# Patient Record
Sex: MALE | Race: WHITE | NOT HISPANIC OR LATINO
[De-identification: names, ages, dates, MRNs, and addresses within clinical notes are randomized per-mention and may not be internally consistent; named-entity substitution may affect disease eponyms.]

---

## 2020-12-10 ENCOUNTER — RESULT REVIEW (OUTPATIENT)
Age: 55
End: 2020-12-10

## 2023-01-30 PROBLEM — Z00.00 ENCOUNTER FOR PREVENTIVE HEALTH EXAMINATION: Status: ACTIVE | Noted: 2023-01-30

## 2023-02-01 ENCOUNTER — APPOINTMENT (OUTPATIENT)
Dept: ENDOCRINOLOGY | Facility: CLINIC | Age: 58
End: 2023-02-01
Payer: COMMERCIAL

## 2023-02-01 VITALS
OXYGEN SATURATION: 97 % | TEMPERATURE: 98.2 F | WEIGHT: 221 LBS | BODY MASS INDEX: 30.94 KG/M2 | DIASTOLIC BLOOD PRESSURE: 80 MMHG | HEART RATE: 92 BPM | SYSTOLIC BLOOD PRESSURE: 150 MMHG | HEIGHT: 71 IN

## 2023-02-01 DIAGNOSIS — Z78.9 OTHER SPECIFIED HEALTH STATUS: ICD-10-CM

## 2023-02-01 LAB
GLUCOSE BLDC GLUCOMTR-MCNC: 263
HBA1C MFR BLD HPLC: 10.8

## 2023-02-01 PROCEDURE — 99204 OFFICE O/P NEW MOD 45 MIN: CPT | Mod: 25

## 2023-02-01 PROCEDURE — 95250 CONT GLUC MNTR PHYS/QHP EQP: CPT

## 2023-02-01 PROCEDURE — 83036 HEMOGLOBIN GLYCOSYLATED A1C: CPT | Mod: QW

## 2023-02-01 RX ORDER — BLOOD-GLUCOSE,RECEIVER,CONT
EACH MISCELLANEOUS
Qty: 1 | Refills: 0 | Status: ACTIVE | COMMUNITY
Start: 2023-02-01 | End: 1900-01-01

## 2023-02-02 LAB
25(OH)D3 SERPL-MCNC: 37.7 NG/ML
ALBUMIN SERPL ELPH-MCNC: 4.7 G/DL
ALP BLD-CCNC: 83 U/L
ALT SERPL-CCNC: 23 U/L
ANION GAP SERPL CALC-SCNC: 16 MMOL/L
AST SERPL-CCNC: 17 U/L
BILIRUB SERPL-MCNC: 1.9 MG/DL
BUN SERPL-MCNC: 11 MG/DL
CALCIUM SERPL-MCNC: 10.3 MG/DL
CHLORIDE SERPL-SCNC: 99 MMOL/L
CHOLEST SERPL-MCNC: 171 MG/DL
CO2 SERPL-SCNC: 22 MMOL/L
CREAT SERPL-MCNC: 0.83 MG/DL
CREAT SPEC-SCNC: 70 MG/DL
EGFR: 101 ML/MIN/1.73M2
ESTIMATED AVERAGE GLUCOSE: 263 MG/DL
FRUCTOSAMINE SERPL-MCNC: 497 UMOL/L
GLUCOSE SERPL-MCNC: 247 MG/DL
GLYCOMARK.: 0.8 UG/ML
HBA1C MFR BLD HPLC: 10.8 %
HDLC SERPL-MCNC: 58 MG/DL
LDLC SERPL CALC-MCNC: 68 MG/DL
LDLC SERPL DIRECT ASSAY-MCNC: 89 MG/DL
MAGNESIUM SERPL-MCNC: 1.9 MG/DL
MICROALBUMIN 24H UR DL<=1MG/L-MCNC: 1.9 MG/DL
MICROALBUMIN/CREAT 24H UR-RTO: 27 MG/G
NONHDLC SERPL-MCNC: 113 MG/DL
POTASSIUM SERPL-SCNC: 4.4 MMOL/L
PROT SERPL-MCNC: 7.7 G/DL
SODIUM SERPL-SCNC: 138 MMOL/L
T3FREE SERPL-MCNC: 2.56 PG/ML
T4 FREE SERPL-MCNC: 1.4 NG/DL
TRIGL SERPL-MCNC: 227 MG/DL
TSH SERPL-ACNC: 1.79 UIU/ML

## 2023-02-05 PROBLEM — Z78.9 NON-SMOKER: Status: ACTIVE | Noted: 2023-02-05

## 2023-02-05 NOTE — HISTORY OF PRESENT ILLNESS
[FreeTextEntry1] : Mr. DECLAN PEÑA is a 57 year old male who presents for initial endocrine evaluation with regard to a hx of type 2 dm. The diabetes has been present since 2006. Prior was following with endo Dr. Saeed and Dr. Thomason. He denies any history of retinopathy or nephropathy. With regard to neuropathy, he has mild numbness under feet b/l but too has herniated disc in lower back , follows with chiropractor.\par \par For the diabetes, he is currently utilizing an insulin pump. He has been on pump for last 2 years. He too is taking  metformin 1000 mg BID. He does use  NovoLog (or Humalog) in the pump He has been under the impression that the basal insulin is not important for his glyc control and he feels he needs  to fine tune his bolus insulin in order to improve his glycemic control..  He though                    does forget to bolus while at work. \par \par is using omnipod dash \par \par Basal rates:\par 12 MN - 12 MN 0.05\par \par IC Ratio: 1:15\par CF: 30 \par \par 1.2 units/day for last week but has been 10 units/day. \par He does note that he does try and go over the bolus for correction.\par \par he did try Victoza in past but could not tolerate it due to N/V. \par \par He did try farxiga but could not tolerate it due to UTI \par \par Sometimes he will turn off basal because when he raises the basal somewhat, he does not feel optimally. As discussed as he increases his basal rate and improves his glycemic control-he may be experiencing relative hypoglycemia.\par \par \par \par He denies polyuria, polydipsia, or any visual changes. He  too denies any skin lesions, skin breakdown or non-healing areas of skin. He  too denies any podiatric concerns. Follows with podiatry Dr. Sandra  Ophthalmologic evaluation is up to date, follows with  . \par \par Home glucose monitoring via Jennifer has shown values to be running 200-250, has spikes up to 350- 400s .  He  does deny any hypoglycemia or hypoglycemic signs or symptoms.\par \par POCT A1C returned today  10.8 ; previously \par POCT glucose returned today at 263 mg/dl\par \par Family Hx: Father has diabetes, HLD, CAD, stroke \par \par On weekends, he is [place] on [place] . 30,000 steps on the weekends \par \par Additional medical history includes that of  HLD, substance use disorder \par Medications: atorvastatin , ASA 81 \par \par Social Hx:  Denies smoking , recovered alcohol for 28 years did use cocaine once 28 years, no active drug use. \par COVID 3x, did take paxlovid in Oct 2022

## 2023-02-09 ENCOUNTER — APPOINTMENT (OUTPATIENT)
Dept: ENDOCRINOLOGY | Facility: CLINIC | Age: 58
End: 2023-02-09
Payer: COMMERCIAL

## 2023-02-09 PROCEDURE — G0108 DIAB MANAGE TRN  PER INDIV: CPT

## 2023-03-07 ENCOUNTER — APPOINTMENT (OUTPATIENT)
Dept: ENDOCRINOLOGY | Facility: CLINIC | Age: 58
End: 2023-03-07
Payer: COMMERCIAL

## 2023-03-07 PROCEDURE — G0108 DIAB MANAGE TRN  PER INDIV: CPT

## 2023-05-12 ENCOUNTER — APPOINTMENT (OUTPATIENT)
Dept: ENDOCRINOLOGY | Facility: CLINIC | Age: 58
End: 2023-05-12
Payer: COMMERCIAL

## 2023-05-12 VITALS
HEART RATE: 85 BPM | SYSTOLIC BLOOD PRESSURE: 130 MMHG | DIASTOLIC BLOOD PRESSURE: 80 MMHG | BODY MASS INDEX: 30.52 KG/M2 | WEIGHT: 218 LBS | TEMPERATURE: 98 F | OXYGEN SATURATION: 97 % | HEIGHT: 71 IN

## 2023-05-12 LAB
GLUCOSE BLDC GLUCOMTR-MCNC: 232
HBA1C MFR BLD HPLC: 10.6

## 2023-05-12 PROCEDURE — 95251 CONT GLUC MNTR ANALYSIS I&R: CPT

## 2023-05-12 PROCEDURE — 83036 HEMOGLOBIN GLYCOSYLATED A1C: CPT | Mod: QW

## 2023-05-12 PROCEDURE — 36415 COLL VENOUS BLD VENIPUNCTURE: CPT

## 2023-05-12 PROCEDURE — 99214 OFFICE O/P EST MOD 30 MIN: CPT | Mod: 25

## 2023-05-12 RX ORDER — INSULIN PMP CART,AUT,G6/7,CNTR
EACH SUBCUTANEOUS
Qty: 45 | Refills: 3 | Status: ACTIVE | COMMUNITY
Start: 2023-05-12 | End: 1900-01-01

## 2023-05-12 RX ORDER — INSULIN PMP CART,AUT,G6/7,CNTR
EACH SUBCUTANEOUS
Qty: 1 | Refills: 0 | Status: ACTIVE | COMMUNITY
Start: 2023-05-12 | End: 1900-01-01

## 2023-05-21 NOTE — HISTORY OF PRESENT ILLNESS
[FreeTextEntry1] : Mr. DECLAN PEÑA is a 57 year old male who returns with regard to a hx of type 2 dm. The diabetes has been present since 2006. Prior was following with endo Dr. Saeed and Dr. Thomason. He denies any history of retinopathy or nephropathy. With regard to neuropathy, he has mild numbness under feet b/l but too has herniated disc in lower back, follows with chiropractor.\par \par For the diabetes, he is currently utilizing an insulin pump. He has been on pump for last 2 years. He too is taking  metformin 1000 mg BID. He does use  NovoLog (or Humalog) in the pump He has been under the impression that the basal insulin is not important for his glyc control and he feels he needs  to fine tune his bolus insulin in order to improve his glycemic control..  He though                    does forget to bolus while at work. \par \par is using omnipod dash \par \par Has met with CDE\par \par Basal rates:\par 12 MN - 12 MN: 0.25 \par After raising his basal rate, 2-3 days later he experiences shaky symptoms but notes that his BG's are around the same ranges as before. \par \par IC Ratio: 1:9\par ISF: 40\par \par AIT 3 hours.\par \par he did try Victoza in past but could not tolerate it due to N/V. \par \par He did try farxiga but could not tolerate it due to UTI\par \par He denies polyuria, polydipsia, or any visual changes. He  too denies any skin lesions, skin breakdown or non-healing areas of skin. He  too denies any podiatric concerns. Follows with podiatry Dr. Sandra  Ophthalmologic evaluation is up to date, follows with Dr. Grace . \par \par Home glucose monitoring via Jennifer has shown values to be running mainly running in the mid 200s all day, with some values in the 300s. \par \par He does deny any hypoglycemia or hypoglycemic signs or symptoms.\par \par POCT A1C returned today 10.6% \par POCT glucose returned today at 232\par \par Additional medical history includes that of  HLD, substance use disorder \par Medications: atorvastatin , ASA 81 \par \par Social Hx: Denies smoking , recovered alcohol for 28 years did use cocaine once 28 years, no active drug use.

## 2023-09-14 ENCOUNTER — APPOINTMENT (OUTPATIENT)
Dept: ENDOCRINOLOGY | Facility: CLINIC | Age: 58
End: 2023-09-14
Payer: COMMERCIAL

## 2023-09-14 VITALS
DIASTOLIC BLOOD PRESSURE: 80 MMHG | SYSTOLIC BLOOD PRESSURE: 140 MMHG | HEART RATE: 90 BPM | OXYGEN SATURATION: 97 % | HEIGHT: 71 IN | WEIGHT: 225.19 LBS | TEMPERATURE: 98 F | BODY MASS INDEX: 31.52 KG/M2

## 2023-09-14 DIAGNOSIS — E66.9 OBESITY, UNSPECIFIED: ICD-10-CM

## 2023-09-14 DIAGNOSIS — E16.2 HYPOGLYCEMIA, UNSPECIFIED: ICD-10-CM

## 2023-09-14 DIAGNOSIS — R03.0 ELEVATED BLOOD-PRESSURE READING, W/OUT DIAGNOSIS OF HYPERTENSION: ICD-10-CM

## 2023-09-14 LAB
GLUCOSE BLDC GLUCOMTR-MCNC: 196
HBA1C MFR BLD HPLC: 8.5

## 2023-09-14 PROCEDURE — 36415 COLL VENOUS BLD VENIPUNCTURE: CPT

## 2023-09-14 PROCEDURE — 99214 OFFICE O/P EST MOD 30 MIN: CPT | Mod: 25

## 2023-09-14 PROCEDURE — 82962 GLUCOSE BLOOD TEST: CPT

## 2023-09-14 PROCEDURE — 83036 HEMOGLOBIN GLYCOSYLATED A1C: CPT | Mod: QW

## 2023-09-15 LAB
ALBUMIN SERPL ELPH-MCNC: 4.7 G/DL
ALP BLD-CCNC: 72 U/L
ALT SERPL-CCNC: 19 U/L
ANION GAP SERPL CALC-SCNC: 15 MMOL/L
AST SERPL-CCNC: 16 U/L
BASOPHILS # BLD AUTO: 0.14 K/UL
BASOPHILS NFR BLD AUTO: 1.5 %
BILIRUB SERPL-MCNC: 1.8 MG/DL
BUN SERPL-MCNC: 11 MG/DL
CALCIUM SERPL-MCNC: 10 MG/DL
CHLORIDE SERPL-SCNC: 100 MMOL/L
CHOLEST SERPL-MCNC: 157 MG/DL
CO2 SERPL-SCNC: 23 MMOL/L
CREAT SERPL-MCNC: 0.91 MG/DL
CREAT SPEC-SCNC: 39 MG/DL
EGFR: 97 ML/MIN/1.73M2
EOSINOPHIL # BLD AUTO: 0.53 K/UL
EOSINOPHIL NFR BLD AUTO: 5.5 %
ESTIMATED AVERAGE GLUCOSE: 200 MG/DL
FRUCTOSAMINE SERPL-MCNC: 378 UMOL/L
GLUCOSE SERPL-MCNC: 180 MG/DL
GLYCOMARK.: 2.2 UG/ML
HBA1C MFR BLD HPLC: 8.6 %
HCT VFR BLD CALC: 46 %
HDLC SERPL-MCNC: 61 MG/DL
HGB BLD-MCNC: 15.2 G/DL
IMM GRANULOCYTES NFR BLD AUTO: 0.6 %
LDLC SERPL CALC-MCNC: 77 MG/DL
LDLC SERPL DIRECT ASSAY-MCNC: 83 MG/DL
LYMPHOCYTES # BLD AUTO: 1.98 K/UL
LYMPHOCYTES NFR BLD AUTO: 20.7 %
MAGNESIUM SERPL-MCNC: 1.9 MG/DL
MAN DIFF?: NORMAL
MCHC RBC-ENTMCNC: 29.6 PG
MCHC RBC-ENTMCNC: 33 GM/DL
MCV RBC AUTO: 89.5 FL
MICROALBUMIN 24H UR DL<=1MG/L-MCNC: <1.2 MG/DL
MICROALBUMIN/CREAT 24H UR-RTO: NORMAL MG/G
MONOCYTES # BLD AUTO: 0.73 K/UL
MONOCYTES NFR BLD AUTO: 7.6 %
NEUTROPHILS # BLD AUTO: 6.12 K/UL
NEUTROPHILS NFR BLD AUTO: 64.1 %
NONHDLC SERPL-MCNC: 95 MG/DL
PLATELET # BLD AUTO: 256 K/UL
POTASSIUM SERPL-SCNC: 4.6 MMOL/L
PROLACTIN SERPL-MCNC: 12.6 NG/ML
PROT SERPL-MCNC: 8 G/DL
PSA SERPL-MCNC: 0.8 NG/ML
RBC # BLD: 5.14 M/UL
RBC # FLD: 13.3 %
SHBG SERPL-SCNC: 29.7 NMOL/L
SODIUM SERPL-SCNC: 138 MMOL/L
T3FREE SERPL-MCNC: 2.67 PG/ML
T4 FREE SERPL-MCNC: 1.2 NG/DL
TRIGL SERPL-MCNC: 99 MG/DL
TSH SERPL-ACNC: 1.19 UIU/ML
WBC # FLD AUTO: 9.56 K/UL

## 2023-09-18 LAB
TESTOST FREE SERPL-MCNC: 12.8 PG/ML
TESTOST SERPL-MCNC: 376 NG/DL

## 2023-11-21 ENCOUNTER — TRANSCRIPTION ENCOUNTER (OUTPATIENT)
Age: 58
End: 2023-11-21

## 2023-12-20 ENCOUNTER — LABORATORY RESULT (OUTPATIENT)
Age: 58
End: 2023-12-20

## 2023-12-20 ENCOUNTER — APPOINTMENT (OUTPATIENT)
Dept: ENDOCRINOLOGY | Facility: CLINIC | Age: 58
End: 2023-12-20
Payer: COMMERCIAL

## 2023-12-20 VITALS
WEIGHT: 236.13 LBS | DIASTOLIC BLOOD PRESSURE: 82 MMHG | SYSTOLIC BLOOD PRESSURE: 140 MMHG | OXYGEN SATURATION: 98 % | HEART RATE: 88 BPM | HEIGHT: 71 IN | BODY MASS INDEX: 33.06 KG/M2

## 2023-12-20 LAB
GLUCOSE BLDC GLUCOMTR-MCNC: 180
HBA1C MFR BLD HPLC: 9.2

## 2023-12-20 PROCEDURE — 99214 OFFICE O/P EST MOD 30 MIN: CPT | Mod: 25

## 2023-12-20 PROCEDURE — 95251 CONT GLUC MNTR ANALYSIS I&R: CPT

## 2023-12-20 PROCEDURE — 83036 HEMOGLOBIN GLYCOSYLATED A1C: CPT | Mod: QW

## 2023-12-20 PROCEDURE — 82962 GLUCOSE BLOOD TEST: CPT

## 2023-12-20 NOTE — HISTORY OF PRESENT ILLNESS
[FreeTextEntry1] : Mr. DECLAN PEÑA is a 58-year-old male who returns with regard to a hx of type 2 dm. The diabetes has been present since 2006. He denies any history of retinopathy or nephropathy. With regard to neuropathy, he has mild numbness under feet b/l but too has herniated disc in lower back, follows with chiropractor.  For the diabetes, he is currently utilizing an insulin pump. He has been on pump for last approximately three years. He too is taking  metformin 1000 mg BID. He does use  NovoLog (or Humalog) in the pump He has been under the impression that the basal insulin is not important for his glycemic control and he feels he needs  to fine tune his bolus insulin in order to improve his glycemic control..  He though does  at times forget to bolus while at work.   is using omnipod 5 Has met with CDE  Basal rates: 12 MN - 12 MN 0.2  IC Ratio: 1:8.5 ISF: 35  Correction: 155 AIT 3 hours.  he did try Victoza in past but could not tolerate it due to N/V.  He did try farxiga but could not tolerate it due to UTI  He denies polyuria, polydipsia, or any visual changes. He  too denies any skin lesions, skin breakdown or non-healing areas of skin. He  too denies any podiatric concerns. Follows with podiatry Dr. Sandra  Ophthalmologic evaluation is up to date, follows with ; no diabetic retinopathy noted, but was told of slight cataract.   Home glucose monitoring via Aleta has shown values to be running sporadically ranging in the mid/high 100s to the high 300s. The patient states that a few weeks ago, his BG ranged between 130-180. He does deny any hypoglycemia or hypoglycemic signs or symptoms.  POCT A1C returned today at 9.2%, previously 8.6% on 09/14/23.  POCT glucose returned today at 180 mg/dL.   Patient notes that he has gained weight since he started taking more insulin. Denies change in diet.  His current weight is 236 pounds, previously 225 pounds in September 2023.   Notes that of late, he has been having rapid heart rate with leg cramps whenever he takes the stairs. There is no shortness of breath.   Additional medical history includes that of HLD, obesity, and substance use disorder  Medications: atorvastatin, ASA 81, vitamin D3 3000 IU daily.

## 2023-12-21 ENCOUNTER — APPOINTMENT (OUTPATIENT)
Dept: ENDOCRINOLOGY | Facility: CLINIC | Age: 58
End: 2023-12-21

## 2023-12-21 LAB
25(OH)D3 SERPL-MCNC: 40.7 NG/ML
ALBUMIN SERPL ELPH-MCNC: 4.4 G/DL
ALP BLD-CCNC: 73 U/L
ALT SERPL-CCNC: 29 U/L
ANION GAP SERPL CALC-SCNC: 14 MMOL/L
AST SERPL-CCNC: 18 U/L
BASOPHILS # BLD AUTO: 0.14 K/UL
BASOPHILS NFR BLD AUTO: 1.6 %
BILIRUB DIRECT SERPL-MCNC: 0.4 MG/DL
BILIRUB INDIRECT SERPL-MCNC: 1.2 MG/DL
BILIRUB SERPL-MCNC: 1.6 MG/DL
BUN SERPL-MCNC: 9 MG/DL
CALCIUM SERPL-MCNC: 9.6 MG/DL
CHLORIDE SERPL-SCNC: 103 MMOL/L
CHOLEST SERPL-MCNC: 159 MG/DL
CO2 SERPL-SCNC: 23 MMOL/L
CREAT SERPL-MCNC: 1 MG/DL
CREAT SPEC-SCNC: 32 MG/DL
EGFR: 86 ML/MIN/1.73M2
EOSINOPHIL # BLD AUTO: 0.46 K/UL
EOSINOPHIL NFR BLD AUTO: 5.3 %
ESTIMATED AVERAGE GLUCOSE: 197 MG/DL
FRUCTOSAMINE SERPL-MCNC: 352 UMOL/L
GLUCOSE SERPL-MCNC: 191 MG/DL
GLYCOMARK.: 2.1 UG/ML
HBA1C MFR BLD HPLC: 8.5 %
HCT VFR BLD CALC: 43.3 %
HDLC SERPL-MCNC: 62 MG/DL
HGB BLD-MCNC: 14.6 G/DL
IMM GRANULOCYTES NFR BLD AUTO: 0.7 %
LDLC SERPL DIRECT ASSAY-MCNC: 82 MG/DL
LYMPHOCYTES # BLD AUTO: 1.62 K/UL
LYMPHOCYTES NFR BLD AUTO: 18.6 %
MAGNESIUM SERPL-MCNC: 1.8 MG/DL
MAN DIFF?: NORMAL
MCHC RBC-ENTMCNC: 29 PG
MCHC RBC-ENTMCNC: 33.7 GM/DL
MCV RBC AUTO: 86.1 FL
MICROALBUMIN 24H UR DL<=1MG/L-MCNC: <1.2 MG/DL
MICROALBUMIN/CREAT 24H UR-RTO: NORMAL MG/G
MONOCYTES # BLD AUTO: 0.69 K/UL
MONOCYTES NFR BLD AUTO: 7.9 %
NEUTROPHILS # BLD AUTO: 5.73 K/UL
NEUTROPHILS NFR BLD AUTO: 65.9 %
PLATELET # BLD AUTO: 246 K/UL
POTASSIUM SERPL-SCNC: 5.2 MMOL/L
PROT SERPL-MCNC: 7.1 G/DL
RBC # BLD: 5.03 M/UL
RBC # FLD: 13.1 %
SODIUM SERPL-SCNC: 139 MMOL/L
T3FREE SERPL-MCNC: 2.78 PG/ML
T4 FREE SERPL-MCNC: 1.2 NG/DL
TRIGL SERPL-MCNC: 130 MG/DL
TSH SERPL-ACNC: 1.62 UIU/ML
WBC # FLD AUTO: 8.7 K/UL

## 2024-03-16 ENCOUNTER — RX RENEWAL (OUTPATIENT)
Age: 59
End: 2024-03-16

## 2024-03-16 RX ORDER — ATORVASTATIN CALCIUM 10 MG/1
10 TABLET, FILM COATED ORAL DAILY
Qty: 90 | Refills: 1 | Status: ACTIVE | COMMUNITY
Start: 2023-03-22 | End: 1900-01-01

## 2024-03-31 ENCOUNTER — RX RENEWAL (OUTPATIENT)
Age: 59
End: 2024-03-31

## 2024-03-31 RX ORDER — METFORMIN HYDROCHLORIDE 1000 MG/1
1000 TABLET, COATED ORAL TWICE DAILY
Qty: 180 | Refills: 2 | Status: ACTIVE | COMMUNITY
Start: 2023-03-22 | End: 1900-01-01

## 2024-05-20 ENCOUNTER — APPOINTMENT (OUTPATIENT)
Dept: ENDOCRINOLOGY | Facility: CLINIC | Age: 59
End: 2024-05-20
Payer: COMMERCIAL

## 2024-05-20 VITALS
TEMPERATURE: 98.2 F | HEIGHT: 71 IN | WEIGHT: 236 LBS | DIASTOLIC BLOOD PRESSURE: 66 MMHG | SYSTOLIC BLOOD PRESSURE: 130 MMHG | OXYGEN SATURATION: 96 % | HEART RATE: 95 BPM | BODY MASS INDEX: 33.04 KG/M2

## 2024-05-20 DIAGNOSIS — E78.5 HYPERLIPIDEMIA, UNSPECIFIED: ICD-10-CM

## 2024-05-20 DIAGNOSIS — E11.9 TYPE 2 DIABETES MELLITUS W/OUT COMPLICATIONS: ICD-10-CM

## 2024-05-20 PROBLEM — E16.2 HYPOGLYCEMIA: Status: ACTIVE | Noted: 2023-02-05

## 2024-05-20 PROBLEM — E66.9 OBESITY: Status: ACTIVE | Noted: 2023-05-21

## 2024-05-20 LAB
GLUCOSE BLDC GLUCOMTR-MCNC: 269
HBA1C MFR BLD HPLC: 8.9

## 2024-05-20 PROCEDURE — 99214 OFFICE O/P EST MOD 30 MIN: CPT

## 2024-05-20 PROCEDURE — 83036 HEMOGLOBIN GLYCOSYLATED A1C: CPT | Mod: QW

## 2024-05-20 PROCEDURE — 82962 GLUCOSE BLOOD TEST: CPT

## 2024-05-20 NOTE — HISTORY OF PRESENT ILLNESS
[FreeTextEntry1] : Mr. DECLAN PEÑA is a 58 year old male who returns with regard to a hx of type 2 dm. The diabetes has been present since 2006. He denies any history of retinopathy or nephropathy. With regard to neuropathy, he has mild numbness under feet b/l but too has herniated disc in lower back.  Fell off ladder and hurt shoulder broke rib on rt. X ray showed old humeral fx.  Too dx with BPH -is on Flomax was recommended to have green light laser procedure.  For the diabetes, he is currently utilizing an insulin pump. He has been on pump for over three years. He too is taking metformin 1000 mg BID. He does use NovoLog (or Humalog) in the pump He though does at times forget to bolus while at work.   is using omnipod 5 Has met with CDE  Basal rates: 12 MN - 12 MN 0.35  IC Ratio: 1:10 ISF: 50  AIT 3 hours.  he did try Victoza in past but could not tolerate it due to N/V.  He did try farxiga but could not tolerate it due to UTI  He denies polyuria, polydipsia, or any visual changes. He too denies any skin lesions, skin breakdown or non-healing areas of skin. He  too denies any podiatric concerns. Follows with podiatry Dr. Sandra  Ophthalmologic evaluation is up to date, follows with  .  Home glucose monitoring via Jennifer has shown values to be running 160-180 in the am   psot meals at 2 hrs in case 180's and at times in 140's.  Weight gain-has snacks at night.   He does deny any hypoglycemia or hypoglycemic signs or symptoms.  POCT A1C returned today at 8.9 % POCT glucose returned today at 269  Additional medical history includes that of HLD,  Medications: atorvastatin , ASA 81 Past hx of alcohol excess -in recovery x almost 30 years and past cocaine usage. Meds: Atorvastatin 10 mg

## 2024-05-20 NOTE — HISTORY OF PRESENT ILLNESS
[FreeTextEntry1] : Mr. DECLAN PEÑA is a 58 year old male who returns with regard to a hx of type 2 dm. The diabetes has been present since 2006. He denies any history of retinopathy or nephropathy. With regard to neuropathy, he has mild numbness under feet b/l but too has herniated disc in lower back.  For the diabetes, he is currently utilizing an insulin pump. He has been on pump for over three years. He too is taking metformin 1000 mg BID. He does use NovoLog (or Humalog) in the pump He though does at times forget to bolus while at work.   is using omnipod 5 Has met with CDE  Basal rates: 12 MN - 12 MN 0.35  IC Ratio: 1:10 ISF: 50  AIT 3 hours.  he did try Victoza in past but could not tolerate it due to N/V.  He did try farxiga but could not tolerate it due to UTI  He denies polyuria, polydipsia, or any visual changes. He too denies any skin lesions, skin breakdown or non-healing areas of skin. He  too denies any podiatric concerns. Follows with podiatry Dr. Sandra  Ophthalmologic evaluation is up to date, follows with  .  Home glucose monitoring via Jennifer has shown values to be running   He does deny any hypoglycemia or hypoglycemic signs or symptoms.  POCT A1C returned today POCT glucose returned today at   Additional medical history includes that of  HLD,  Medications: atorvastatin , ASA 81 Past hx of alcohol excess -in recovery x almost 30 years and past cocaine usage. Meds: Atorvastatin 10 mg

## 2024-05-20 NOTE — ADDENDUM
[FreeTextEntry1] : poct glucose and a1c were carried out today given diabetes diagnosis blood will be drawn in the office today

## 2024-05-21 DIAGNOSIS — R89.9 UNSPECIFIED ABNORMAL FINDING IN SPECIMENS FROM OTHER ORGANS, SYSTEMS AND TISSUES: ICD-10-CM

## 2024-05-21 DIAGNOSIS — E53.8 DEFICIENCY OF OTHER SPECIFIED B GROUP VITAMINS: ICD-10-CM

## 2024-05-21 LAB
25(OH)D3 SERPL-MCNC: 41.1 NG/ML
ALBUMIN SERPL ELPH-MCNC: 4.5 G/DL
ALP BLD-CCNC: 122 U/L
ALT SERPL-CCNC: 19 U/L
ANION GAP SERPL CALC-SCNC: 15 MMOL/L
AST SERPL-CCNC: 15 U/L
BASOPHILS # BLD AUTO: 0.16 K/UL
BASOPHILS NFR BLD AUTO: 1.8 %
BILIRUB SERPL-MCNC: 1.4 MG/DL
BUN SERPL-MCNC: 13 MG/DL
CALCIUM SERPL-MCNC: 10 MG/DL
CHLORIDE SERPL-SCNC: 98 MMOL/L
CHOLEST SERPL-MCNC: 161 MG/DL
CO2 SERPL-SCNC: 23 MMOL/L
CREAT SERPL-MCNC: 1.03 MG/DL
CREAT SPEC-SCNC: 25 MG/DL
EGFR: 83 ML/MIN/1.73M2
EOSINOPHIL # BLD AUTO: 0.68 K/UL
EOSINOPHIL NFR BLD AUTO: 7.5 %
ESTIMATED AVERAGE GLUCOSE: 206 MG/DL
FRUCTOSAMINE SERPL-MCNC: 373 UMOL/L
GLUCOSE SERPL-MCNC: 256 MG/DL
GLYCOMARK.: 1.7 UG/ML
HBA1C MFR BLD HPLC: 8.8 %
HCT VFR BLD CALC: 44.5 %
HDLC SERPL-MCNC: 60 MG/DL
HGB BLD-MCNC: 14.9 G/DL
IMM GRANULOCYTES NFR BLD AUTO: 0.6 %
LDLC SERPL DIRECT ASSAY-MCNC: 92 MG/DL
LYMPHOCYTES # BLD AUTO: 2.11 K/UL
LYMPHOCYTES NFR BLD AUTO: 23.4 %
MAGNESIUM SERPL-MCNC: 1.9 MG/DL
MAN DIFF?: NORMAL
MCHC RBC-ENTMCNC: 28.6 PG
MCHC RBC-ENTMCNC: 33.5 GM/DL
MCV RBC AUTO: 85.4 FL
MICROALBUMIN 24H UR DL<=1MG/L-MCNC: <1.2 MG/DL
MICROALBUMIN/CREAT 24H UR-RTO: NORMAL MG/G
MONOCYTES # BLD AUTO: 0.79 K/UL
MONOCYTES NFR BLD AUTO: 8.8 %
NEUTROPHILS # BLD AUTO: 5.22 K/UL
NEUTROPHILS NFR BLD AUTO: 57.9 %
PLATELET # BLD AUTO: 297 K/UL
POTASSIUM SERPL-SCNC: 5.2 MMOL/L
PROT SERPL-MCNC: 7.6 G/DL
RBC # BLD: 5.21 M/UL
RBC # FLD: 13 %
SODIUM SERPL-SCNC: 136 MMOL/L
T3FREE SERPL-MCNC: 2.91 PG/ML
T4 FREE SERPL-MCNC: 1.3 NG/DL
TRIGL SERPL-MCNC: 92 MG/DL
TSH SERPL-ACNC: 2.28 UIU/ML
VIT B12 SERPL-MCNC: 338 PG/ML
WBC # FLD AUTO: 9.01 K/UL

## 2024-05-21 RX ORDER — INSULIN ASPART 100 [IU]/ML
100 INJECTION, SOLUTION INTRAVENOUS; SUBCUTANEOUS
Qty: 9 | Refills: 3 | Status: ACTIVE | COMMUNITY
Start: 2023-03-22 | End: 1900-01-01

## 2024-05-21 RX ORDER — MAGNESIUM 200 MG
1000 TABLET ORAL
Qty: 45 | Refills: 1 | Status: ACTIVE | COMMUNITY
Start: 2024-05-21 | End: 1900-01-01

## 2024-05-21 RX ORDER — BLOOD-GLUCOSE TRANSMITTER
EACH MISCELLANEOUS
Qty: 1 | Refills: 3 | Status: ACTIVE | COMMUNITY
Start: 2023-02-01 | End: 1900-01-01

## 2024-05-21 RX ORDER — BLOOD-GLUCOSE SENSOR
EACH MISCELLANEOUS
Qty: 3 | Refills: 3 | Status: ACTIVE | COMMUNITY
Start: 2023-02-01 | End: 1900-01-01

## 2024-06-18 LAB
ALP BLD-CCNC: 87 IU/L
ALP BONE CFR SERPL: 43 %
ALP INTEST CFR SERPL: 0 %
ALP LIVER CFR SERPL: 57 %
GGT SERPL-CCNC: 15 U/L

## 2024-11-01 ENCOUNTER — APPOINTMENT (OUTPATIENT)
Dept: ENDOCRINOLOGY | Facility: CLINIC | Age: 59
End: 2024-11-01

## 2024-11-01 VITALS
HEART RATE: 85 BPM | HEIGHT: 71 IN | TEMPERATURE: 97.8 F | DIASTOLIC BLOOD PRESSURE: 80 MMHG | BODY MASS INDEX: 32.66 KG/M2 | WEIGHT: 233.31 LBS | SYSTOLIC BLOOD PRESSURE: 156 MMHG | OXYGEN SATURATION: 96 %

## 2024-11-01 VITALS — DIASTOLIC BLOOD PRESSURE: 76 MMHG | SYSTOLIC BLOOD PRESSURE: 132 MMHG

## 2024-11-01 DIAGNOSIS — E66.9 OBESITY, UNSPECIFIED: ICD-10-CM

## 2024-11-01 DIAGNOSIS — E78.5 HYPERLIPIDEMIA, UNSPECIFIED: ICD-10-CM

## 2024-11-01 DIAGNOSIS — E16.2 HYPOGLYCEMIA, UNSPECIFIED: ICD-10-CM

## 2024-11-01 DIAGNOSIS — E53.8 DEFICIENCY OF OTHER SPECIFIED B GROUP VITAMINS: ICD-10-CM

## 2024-11-01 DIAGNOSIS — E11.9 TYPE 2 DIABETES MELLITUS W/OUT COMPLICATIONS: ICD-10-CM

## 2024-11-01 LAB
GLUCOSE BLDC GLUCOMTR-MCNC: 222
HBA1C MFR BLD HPLC: 8.7

## 2024-11-01 PROCEDURE — 83036 HEMOGLOBIN GLYCOSYLATED A1C: CPT | Mod: QW

## 2024-11-01 PROCEDURE — 99214 OFFICE O/P EST MOD 30 MIN: CPT

## 2024-11-01 PROCEDURE — 82962 GLUCOSE BLOOD TEST: CPT

## 2024-11-01 PROCEDURE — 36415 COLL VENOUS BLD VENIPUNCTURE: CPT

## 2024-11-01 PROCEDURE — G2211 COMPLEX E/M VISIT ADD ON: CPT | Mod: NC

## 2024-11-02 ENCOUNTER — RX RENEWAL (OUTPATIENT)
Age: 59
End: 2024-11-02

## 2024-11-04 LAB
25(OH)D3 SERPL-MCNC: 41.6 NG/ML
ALBUMIN SERPL ELPH-MCNC: 4.4 G/DL
ALP BLD-CCNC: 90 U/L
ALT SERPL-CCNC: 28 U/L
ANION GAP SERPL CALC-SCNC: 16 MMOL/L
APO B SERPL-MCNC: 75 MG/DL
AST SERPL-CCNC: 19 U/L
BASOPHILS # BLD AUTO: 0.15 K/UL
BASOPHILS NFR BLD AUTO: 1.9 %
BILIRUB SERPL-MCNC: 1.6 MG/DL
BUN SERPL-MCNC: 10 MG/DL
CALCIUM SERPL-MCNC: 9.6 MG/DL
CHLORIDE SERPL-SCNC: 100 MMOL/L
CHOLEST SERPL-MCNC: 147 MG/DL
CO2 SERPL-SCNC: 22 MMOL/L
CREAT SERPL-MCNC: 0.92 MG/DL
CREAT SPEC-SCNC: 31 MG/DL
EGFR: 95 ML/MIN/1.73M2
EOSINOPHIL # BLD AUTO: 0.5 K/UL
EOSINOPHIL NFR BLD AUTO: 6.5 %
ESTIMATED AVERAGE GLUCOSE: 194 MG/DL
FRUCTOSAMINE SERPL-MCNC: 352 UMOL/L
GLUCOSE SERPL-MCNC: 241 MG/DL
GLYCOMARK.: 2.6 UG/ML
HBA1C MFR BLD HPLC: 8.4 %
HCT VFR BLD CALC: 44.7 %
HCYS SERPL-MCNC: 13 UMOL/L
HDLC SERPL-MCNC: 60 MG/DL
HGB BLD-MCNC: 14.5 G/DL
IMM GRANULOCYTES NFR BLD AUTO: 0.5 %
LDLC SERPL DIRECT ASSAY-MCNC: 77 MG/DL
LYMPHOCYTES # BLD AUTO: 1.69 K/UL
LYMPHOCYTES NFR BLD AUTO: 21.9 %
MAGNESIUM SERPL-MCNC: 1.8 MG/DL
MAN DIFF?: NORMAL
MCHC RBC-ENTMCNC: 29.4 PG
MCHC RBC-ENTMCNC: 32.4 G/DL
MCV RBC AUTO: 90.5 FL
MICROALBUMIN 24H UR DL<=1MG/L-MCNC: <1.2 MG/DL
MICROALBUMIN/CREAT 24H UR-RTO: NORMAL MG/G
MONOCYTES # BLD AUTO: 0.7 K/UL
MONOCYTES NFR BLD AUTO: 9.1 %
NEUTROPHILS # BLD AUTO: 4.65 K/UL
NEUTROPHILS NFR BLD AUTO: 60.1 %
PLATELET # BLD AUTO: 233 K/UL
POTASSIUM SERPL-SCNC: 5.1 MMOL/L
PROT SERPL-MCNC: 7 G/DL
RBC # BLD: 4.94 M/UL
RBC # FLD: 13 %
SODIUM SERPL-SCNC: 138 MMOL/L
T3FREE SERPL-MCNC: 3.99 PG/ML
T4 FREE SERPL-MCNC: 1.3 NG/DL
TRIGL SERPL-MCNC: 91 MG/DL
TSH SERPL-ACNC: 0.99 UIU/ML
WBC # FLD AUTO: 7.73 K/UL

## 2025-03-05 ENCOUNTER — RX RENEWAL (OUTPATIENT)
Age: 60
End: 2025-03-05

## 2025-04-19 ENCOUNTER — APPOINTMENT (OUTPATIENT)
Dept: ENDOCRINOLOGY | Facility: CLINIC | Age: 60
End: 2025-04-19

## 2025-04-19 VITALS
BODY MASS INDEX: 32.76 KG/M2 | HEIGHT: 71 IN | OXYGEN SATURATION: 97 % | DIASTOLIC BLOOD PRESSURE: 70 MMHG | SYSTOLIC BLOOD PRESSURE: 140 MMHG | TEMPERATURE: 98.3 F | WEIGHT: 234 LBS | HEART RATE: 93 BPM

## 2025-04-19 DIAGNOSIS — E11.9 TYPE 2 DIABETES MELLITUS W/OUT COMPLICATIONS: ICD-10-CM

## 2025-04-19 DIAGNOSIS — E16.2 HYPOGLYCEMIA, UNSPECIFIED: ICD-10-CM

## 2025-04-19 DIAGNOSIS — E78.5 HYPERLIPIDEMIA, UNSPECIFIED: ICD-10-CM

## 2025-04-19 DIAGNOSIS — E53.8 DEFICIENCY OF OTHER SPECIFIED B GROUP VITAMINS: ICD-10-CM

## 2025-04-19 DIAGNOSIS — E66.9 OBESITY, UNSPECIFIED: ICD-10-CM

## 2025-04-19 DIAGNOSIS — Z12.5 ENCOUNTER FOR SCREENING FOR MALIGNANT NEOPLASM OF PROSTATE: ICD-10-CM

## 2025-04-19 PROCEDURE — G2211 COMPLEX E/M VISIT ADD ON: CPT | Mod: NC

## 2025-04-19 PROCEDURE — 83036 HEMOGLOBIN GLYCOSYLATED A1C: CPT | Mod: QW

## 2025-04-19 PROCEDURE — 95251 CONT GLUC MNTR ANALYSIS I&R: CPT

## 2025-04-19 PROCEDURE — 82962 GLUCOSE BLOOD TEST: CPT

## 2025-04-19 PROCEDURE — 99214 OFFICE O/P EST MOD 30 MIN: CPT

## 2025-04-21 LAB
GLUCOSE BLDC GLUCOMTR-MCNC: 180
HBA1C MFR BLD HPLC: 8.4

## 2025-04-24 ENCOUNTER — INPATIENT (INPATIENT)
Facility: HOSPITAL | Age: 60
LOS: 3 days | Discharge: HOME | DRG: 446 | End: 2025-04-28
Attending: HOSPITALIST | Admitting: HOSPITALIST
Payer: COMMERCIAL

## 2025-04-24 VITALS
WEIGHT: 229.94 LBS | TEMPERATURE: 98 F | DIASTOLIC BLOOD PRESSURE: 78 MMHG | RESPIRATION RATE: 16 BRPM | HEART RATE: 98 BPM | SYSTOLIC BLOOD PRESSURE: 160 MMHG | OXYGEN SATURATION: 98 % | HEIGHT: 71 IN

## 2025-04-24 LAB
ALBUMIN SERPL ELPH-MCNC: 3.7 G/DL — SIGNIFICANT CHANGE UP (ref 3.3–5)
ALP SERPL-CCNC: 82 U/L — SIGNIFICANT CHANGE UP (ref 40–120)
ALT FLD-CCNC: 24 U/L — SIGNIFICANT CHANGE UP (ref 12–78)
ANION GAP SERPL CALC-SCNC: 14 MMOL/L — SIGNIFICANT CHANGE UP (ref 5–17)
APPEARANCE UR: CLEAR — SIGNIFICANT CHANGE UP
AST SERPL-CCNC: 12 U/L — LOW (ref 15–37)
BASOPHILS # BLD AUTO: 0.07 K/UL — SIGNIFICANT CHANGE UP (ref 0–0.2)
BASOPHILS NFR BLD AUTO: 0.4 % — SIGNIFICANT CHANGE UP (ref 0–2)
BILIRUB SERPL-MCNC: 3.4 MG/DL — HIGH (ref 0.2–1.2)
BILIRUB UR-MCNC: NEGATIVE — SIGNIFICANT CHANGE UP
BUN SERPL-MCNC: 12 MG/DL — SIGNIFICANT CHANGE UP (ref 7–23)
CALCIUM SERPL-MCNC: 9.9 MG/DL — SIGNIFICANT CHANGE UP (ref 8.5–10.1)
CHLORIDE SERPL-SCNC: 97 MMOL/L — SIGNIFICANT CHANGE UP (ref 96–108)
CO2 SERPL-SCNC: 24 MMOL/L — SIGNIFICANT CHANGE UP (ref 22–31)
COLOR SPEC: YELLOW — SIGNIFICANT CHANGE UP
CREAT SERPL-MCNC: 1.1 MG/DL — SIGNIFICANT CHANGE UP (ref 0.5–1.3)
DIFF PNL FLD: NEGATIVE — SIGNIFICANT CHANGE UP
EGFR: 76 ML/MIN/1.73M2 — SIGNIFICANT CHANGE UP
EGFR: 76 ML/MIN/1.73M2 — SIGNIFICANT CHANGE UP
EOSINOPHIL # BLD AUTO: 0.01 K/UL — SIGNIFICANT CHANGE UP (ref 0–0.5)
EOSINOPHIL NFR BLD AUTO: 0.1 % — SIGNIFICANT CHANGE UP (ref 0–6)
GLUCOSE SERPL-MCNC: 278 MG/DL — HIGH (ref 70–99)
GLUCOSE UR QL: >=1000 MG/DL
HCT VFR BLD CALC: 42.9 % — SIGNIFICANT CHANGE UP (ref 39–50)
HGB BLD-MCNC: 15.3 G/DL — SIGNIFICANT CHANGE UP (ref 13–17)
IMM GRANULOCYTES NFR BLD AUTO: 0.6 % — SIGNIFICANT CHANGE UP (ref 0–0.9)
KETONES UR-MCNC: 40 MG/DL
LEUKOCYTE ESTERASE UR-ACNC: NEGATIVE — SIGNIFICANT CHANGE UP
LIDOCAIN IGE QN: 17 U/L — SIGNIFICANT CHANGE UP (ref 13–75)
LYMPHOCYTES # BLD AUTO: 1.19 K/UL — SIGNIFICANT CHANGE UP (ref 1–3.3)
LYMPHOCYTES # BLD AUTO: 6.5 % — LOW (ref 13–44)
MCHC RBC-ENTMCNC: 29.1 PG — SIGNIFICANT CHANGE UP (ref 27–34)
MCHC RBC-ENTMCNC: 35.7 G/DL — SIGNIFICANT CHANGE UP (ref 32–36)
MCV RBC AUTO: 81.6 FL — SIGNIFICANT CHANGE UP (ref 80–100)
MONOCYTES # BLD AUTO: 1.36 K/UL — HIGH (ref 0–0.9)
MONOCYTES NFR BLD AUTO: 7.4 % — SIGNIFICANT CHANGE UP (ref 2–14)
NEUTROPHILS # BLD AUTO: 15.69 K/UL — HIGH (ref 1.8–7.4)
NEUTROPHILS NFR BLD AUTO: 85 % — HIGH (ref 43–77)
NITRITE UR-MCNC: NEGATIVE — SIGNIFICANT CHANGE UP
NRBC BLD AUTO-RTO: 0 /100 WBCS — SIGNIFICANT CHANGE UP (ref 0–0)
PH UR: 6 — SIGNIFICANT CHANGE UP (ref 5–8)
PLATELET # BLD AUTO: 228 K/UL — SIGNIFICANT CHANGE UP (ref 150–400)
POTASSIUM SERPL-MCNC: 4.2 MMOL/L — SIGNIFICANT CHANGE UP (ref 3.5–5.3)
POTASSIUM SERPL-SCNC: 4.2 MMOL/L — SIGNIFICANT CHANGE UP (ref 3.5–5.3)
PROT SERPL-MCNC: 8.2 G/DL — SIGNIFICANT CHANGE UP (ref 6–8.3)
PROT UR-MCNC: NEGATIVE MG/DL — SIGNIFICANT CHANGE UP
RBC # BLD: 5.26 M/UL — SIGNIFICANT CHANGE UP (ref 4.2–5.8)
RBC # FLD: 12.7 % — SIGNIFICANT CHANGE UP (ref 10.3–14.5)
SODIUM SERPL-SCNC: 135 MMOL/L — SIGNIFICANT CHANGE UP (ref 135–145)
SP GR SPEC: 1.04 — HIGH (ref 1–1.03)
UROBILINOGEN FLD QL: 0.2 MG/DL — SIGNIFICANT CHANGE UP (ref 0.2–1)
WBC # BLD: 18.43 K/UL — HIGH (ref 3.8–10.5)
WBC # FLD AUTO: 18.43 K/UL — HIGH (ref 3.8–10.5)

## 2025-04-24 PROCEDURE — 78226 HEPATOBILIARY SYSTEM IMAGING: CPT | Mod: 26

## 2025-04-24 PROCEDURE — 93010 ELECTROCARDIOGRAM REPORT: CPT

## 2025-04-24 PROCEDURE — 76705 ECHO EXAM OF ABDOMEN: CPT | Mod: 26

## 2025-04-24 PROCEDURE — 99285 EMERGENCY DEPT VISIT HI MDM: CPT

## 2025-04-24 PROCEDURE — 74177 CT ABD & PELVIS W/CONTRAST: CPT | Mod: 26

## 2025-04-24 RX ORDER — DEXTROSE 50 % IN WATER 50 %
12.5 SYRINGE (ML) INTRAVENOUS ONCE
Refills: 0 | Status: DISCONTINUED | OUTPATIENT
Start: 2025-04-24 | End: 2025-04-25

## 2025-04-24 RX ORDER — PIPERACILLIN-TAZO-DEXTROSE,ISO 2.25G/50ML
3.38 IV SOLUTION, PIGGYBACK PREMIX FROZEN(ML) INTRAVENOUS EVERY 8 HOURS
Refills: 0 | Status: DISCONTINUED | OUTPATIENT
Start: 2025-04-24 | End: 2025-04-25

## 2025-04-24 RX ORDER — ACETAMINOPHEN 500 MG/5ML
1000 LIQUID (ML) ORAL EVERY 8 HOURS
Refills: 0 | Status: DISCONTINUED | OUTPATIENT
Start: 2025-04-24 | End: 2025-04-25

## 2025-04-24 RX ORDER — INDOCYANINE GREEN AND WATER 25 MG
1.5 KIT INJECTION ONCE
Refills: 0 | Status: COMPLETED | OUTPATIENT
Start: 2025-04-25 | End: 2025-04-25

## 2025-04-24 RX ORDER — ATORVASTATIN CALCIUM 80 MG/1
10 TABLET, FILM COATED ORAL AT BEDTIME
Refills: 0 | Status: DISCONTINUED | OUTPATIENT
Start: 2025-04-24 | End: 2025-04-25

## 2025-04-24 RX ORDER — DEXTROSE 50 % IN WATER 50 %
25 SYRINGE (ML) INTRAVENOUS ONCE
Refills: 0 | Status: DISCONTINUED | OUTPATIENT
Start: 2025-04-24 | End: 2025-04-25

## 2025-04-24 RX ORDER — ATORVASTATIN CALCIUM 80 MG/1
1 TABLET, FILM COATED ORAL
Refills: 0 | DISCHARGE

## 2025-04-24 RX ORDER — SODIUM CHLORIDE 9 G/1000ML
1000 INJECTION, SOLUTION INTRAVENOUS
Refills: 0 | Status: DISCONTINUED | OUTPATIENT
Start: 2025-04-24 | End: 2025-04-25

## 2025-04-24 RX ORDER — PIPERACILLIN-TAZO-DEXTROSE,ISO 2.25G/50ML
3.38 IV SOLUTION, PIGGYBACK PREMIX FROZEN(ML) INTRAVENOUS ONCE
Refills: 0 | Status: COMPLETED | OUTPATIENT
Start: 2025-04-24 | End: 2025-04-24

## 2025-04-24 RX ORDER — METFORMIN HYDROCHLORIDE 850 MG/1
0 TABLET ORAL
Refills: 0 | DISCHARGE

## 2025-04-24 RX ORDER — DEXTROSE 50 % IN WATER 50 %
15 SYRINGE (ML) INTRAVENOUS ONCE
Refills: 0 | Status: DISCONTINUED | OUTPATIENT
Start: 2025-04-24 | End: 2025-04-25

## 2025-04-24 RX ORDER — MAGNESIUM, ALUMINUM HYDROXIDE 200-200 MG
30 TABLET,CHEWABLE ORAL EVERY 4 HOURS
Refills: 0 | Status: DISCONTINUED | OUTPATIENT
Start: 2025-04-24 | End: 2025-04-25

## 2025-04-24 RX ORDER — SODIUM CHLORIDE 9 G/1000ML
1000 INJECTION, SOLUTION INTRAVENOUS
Refills: 0 | Status: DISCONTINUED | OUTPATIENT
Start: 2025-04-24 | End: 2025-04-24

## 2025-04-24 RX ORDER — TAMSULOSIN HYDROCHLORIDE 0.4 MG/1
0.4 CAPSULE ORAL AT BEDTIME
Refills: 0 | Status: DISCONTINUED | OUTPATIENT
Start: 2025-04-24 | End: 2025-04-25

## 2025-04-24 RX ORDER — TAMSULOSIN HYDROCHLORIDE 0.4 MG/1
1 CAPSULE ORAL
Refills: 0 | DISCHARGE

## 2025-04-24 RX ORDER — GLUCAGON 3 MG/1
1 POWDER NASAL ONCE
Refills: 0 | Status: DISCONTINUED | OUTPATIENT
Start: 2025-04-24 | End: 2025-04-25

## 2025-04-24 RX ORDER — METFORMIN HYDROCHLORIDE 850 MG/1
1 TABLET ORAL
Refills: 0 | DISCHARGE

## 2025-04-24 RX ORDER — ONDANSETRON HCL/PF 4 MG/2 ML
4 VIAL (ML) INJECTION EVERY 8 HOURS
Refills: 0 | Status: DISCONTINUED | OUTPATIENT
Start: 2025-04-24 | End: 2025-04-25

## 2025-04-24 RX ORDER — ACETAMINOPHEN 500 MG/5ML
1000 LIQUID (ML) ORAL ONCE
Refills: 0 | Status: COMPLETED | OUTPATIENT
Start: 2025-04-24 | End: 2025-04-24

## 2025-04-24 RX ORDER — MELATONIN 5 MG
3 TABLET ORAL AT BEDTIME
Refills: 0 | Status: DISCONTINUED | OUTPATIENT
Start: 2025-04-24 | End: 2025-04-25

## 2025-04-24 RX ORDER — ASPIRIN 325 MG
1 TABLET ORAL
Refills: 0 | DISCHARGE

## 2025-04-24 RX ORDER — ACETAMINOPHEN 500 MG/5ML
650 LIQUID (ML) ORAL EVERY 6 HOURS
Refills: 0 | Status: DISCONTINUED | OUTPATIENT
Start: 2025-04-24 | End: 2025-04-25

## 2025-04-24 RX ADMIN — TAMSULOSIN HYDROCHLORIDE 0.4 MILLIGRAM(S): 0.4 CAPSULE ORAL at 22:34

## 2025-04-24 RX ADMIN — Medication 40 MILLIGRAM(S): at 10:28

## 2025-04-24 RX ADMIN — Medication 1000 MILLIGRAM(S): at 13:50

## 2025-04-24 RX ADMIN — Medication 200 GRAM(S): at 14:01

## 2025-04-24 RX ADMIN — Medication 400 MILLIGRAM(S): at 22:34

## 2025-04-24 RX ADMIN — SODIUM CHLORIDE 100 MILLILITER(S): 9 INJECTION, SOLUTION INTRAVENOUS at 18:28

## 2025-04-24 RX ADMIN — Medication 1000 MILLILITER(S): at 10:28

## 2025-04-24 RX ADMIN — Medication 400 MILLIGRAM(S): at 10:28

## 2025-04-24 RX ADMIN — Medication 25 GRAM(S): at 22:37

## 2025-04-24 RX ADMIN — ATORVASTATIN CALCIUM 10 MILLIGRAM(S): 80 TABLET, FILM COATED ORAL at 22:34

## 2025-04-24 RX ADMIN — Medication 1000 MILLILITER(S): at 13:50

## 2025-04-24 NOTE — H&P ADULT - HISTORY OF PRESENT ILLNESS
59m in recovery x 30 years with DM on insulin pump, BPH, HLD,  p/w abd pain. Pain started last night after eating chik-[employer]ent[employer]-a.  pain is RUQ, nonradiating, and was severe. pt reports nausea and anorexia.  Of note pt had EGD yesterday and was told there were ulcers. Pt has no cardiac history, has good exercise tolerance, and denies cp, palpitations, and sob.

## 2025-04-24 NOTE — ED PROVIDER NOTE - CLINICAL SUMMARY MEDICAL DECISION MAKING FREE TEXT BOX
Right-sided abdominal pain post endoscopy yesterday with case requiring thorough evaluation performed in an independent manner followed by routine labs CBC comprehensive metabolic panel urinalysis PT PTT and lipase as well as CT scan of the abdomen and pelvis with IV contrast.  Medications to be administered as well.

## 2025-04-24 NOTE — ED ADULT TRIAGE NOTE - SOURCE OF INFORMATION
"[facility] Outpatient Occupational Therapy  Initial Evaluation/Discharge Note     Date: 8/31/2020  Name: Ayesha Thompson  Clinic Number: [id number]    Therapy Diagnosis: Paraplegia  Physician: Yvonne Mendoza Gila Paul, OT           Patient

## 2025-04-24 NOTE — ED PROVIDER NOTE - PHYSICAL EXAMINATION
Examination reveals a well-developed well-nourished white male lying comfortably in bed in no acute distress with clear lungs normal heart sounds and abdomen that slightly protuberant with a palpable ventral hernia and a small umbilical hernia both nonincarcerated slight right upper and right mid/right lower quadrant abdominal tenderness to deep palpation.  Extremities are free from any clubbing cyanosis or edema neurologic evaluation shows patient awake and alert oriented x 4 without any focal neurologic deficits.

## 2025-04-24 NOTE — H&P ADULT - ASSESSMENT
59m in recovery x 30 years with DM on insulin pump, BPH, HLD,  p/w acute cholecystitis  positive HIDA  US and CT consistent with acute cholecystitis  surgery following  plan for lap melanie  medically acceptable for procedure  npo    pain  iv tylenol  pt wants to avoid narcotics    DM  diabetic diet  fingersticks  continue insulin pump  endocrine consulted    BPH  continue flomax    HLD  continue lipitor

## 2025-04-24 NOTE — ED ADULT NURSE NOTE - OBJECTIVE STATEMENT
Pt received in bed alert and oriented with the c/o abd pain and tenderness since yesterday. As per pt he had an upper endoscopy yesterday that showed an ulcer and then he had a chick-[employer]ent[employer]-A sandwich for dinner and experienced excruciating  pain. As per Md's orders IV shanice placed blod specimen obtained and sent to the lab pt stable and meds given and tolerated well. Nursing care ongoing and safety maintained

## 2025-04-24 NOTE — ED PROVIDER NOTE - OBJECTIVE STATEMENT
Patient is a 59-year-old  white male with history of insulin requiring diabetes and hyperlipidemia who presents to the emergency department here today complaining of right-sided abdominal pain.  Patient's had intermittent bouts of loose stools possibly food related culminating in an elective scheduled endoscopy 24 hours ago.  Patient was asymptomatic following the procedure at 8:00 yesterday morning.  Patient was discharged at 10:00 AM.  Patient ate lunch in an uneventful manner but shortly thereafter began to develop mild right-sided nonradiating abdominal pain without any nausea or vomiting.  Patient ultimately called her gastroenterologist who informed patient that it was most likely related to air/gas but to seek medical attention if pain persisted.  Upon awakening this morning patient was significant but intensity declined.  As result of continued right-sided abdominal pain patient presents here with wife for further evaluation care treatment and management.

## 2025-04-24 NOTE — ED PROVIDER NOTE - PROGRESS NOTE DETAILS
Patient seen by surgical service requested admission to the medical service with diagnosis of acute cholecystitis. Case discussed with Dr. Dank Ballard and patient will be admitted to his service with continued evaluation with Dr. Elvis Lawrence surgery service.

## 2025-04-24 NOTE — ED ADULT NURSE NOTE - IS THE PATIENT ABLE TO BE SCREENED?
[de-identified] : 
Yes

## 2025-04-24 NOTE — CONSULT NOTE ADULT - SUBJECTIVE AND OBJECTIVE BOX
SURGERY CONSULT NOTE:    CHIEF COMPLAINT:  Patient is a 59y old  Male who presents with a chief complaint of RUQ abdominal pain    HPI:  59M PMHx Gilbert syndrome, cholelithiasis, diabetes, hyperlipidemia who presents with one day of right upper quadrant pain. Yesterday morning patient underwent EGD for evaluation of 'loose stools' and was found to have ulcers. Patient initially felt well after procedure and went to [facility] for a chicken sandwich, after which he noted RUQ abdominal pain. Mild nausea overnight but no emesis. Denies fevers or chills. Last BM the day before procedure (4/22); patient states he took imodium yesterday 'out of habit'. Passed flatus yesterday but not as much today. Patient has remote history of RUQ pain after fatty meals and was previously found to have a 'mobile gallstone'. His symptoms resolved with diet modification and has not had biliary colic in a while.     Denies prior surgery. NKDA. Denies tobacco use. Sober from alcohol x30 years. Last PO intake sips of water this morning.    REVIEW OF SYSTEMS:  12 point ROS negative except as per HPI    MEDICATIONS:  Home Medications:    MEDICATIONS  (STANDING):    MEDICATIONS  (PRN):      ALLERGIES:  Allergies    No Known Allergies    Intolerances        SOCIAL HISTORY:  Social History:    Smoking: Yes [ ]  No [X]   ______pk yrs  ETOH  Yes [ ]  No [X]  Social [ ]  DRUGS:  Yes [ ]  No [ ]  if so what______________    FAMILY HISTORY:  FAMILY HISTORY:      VITAL SIGNS:  Vital Signs Last 24 Hrs  T(C): 36.8 (24 Apr 2025 09:00), Max: 36.8 (24 Apr 2025 09:00)  T(F): 98.2 (24 Apr 2025 09:00), Max: 98.2 (24 Apr 2025 09:00)  HR: 98 (24 Apr 2025 09:00) (98 - 98)  BP: 160/78 (24 Apr 2025 09:00) (160/78 - 160/78)  BP(mean): --  RR: 16 (24 Apr 2025 09:00) (16 - 16)  SpO2: 98% (24 Apr 2025 09:00) (98% - 98%)    Parameters below as of 24 Apr 2025 09:00  Patient On (Oxygen Delivery Method): room air        PHYSICAL EXAM:  Gen: well-appearing, NAD  Resp: breathing comfortably on RA  CV: warm and well perfused  Abdomen: Soft, mildly distended, not tympanic, RUQ tender without rebound or guarding, no epigastric tenderness, small umbilical hernia  Ext: moving all extremities    LABS:                        15.3   18.43 )-----------( 228      ( 24 Apr 2025 10:40 )             42.9     04-24    135  |  97  |  12  ----------------------------<  278[H]  4.2   |  24  |  1.10    Ca    9.9      24 Apr 2025 10:40    TPro  8.2  /  Alb  3.7  /  TBili  3.4[H]  /  DBili  x   /  AST  12[L]  /  ALT  24  /  AlkPhos  82  04-24      Urinalysis Basic - ( 24 Apr 2025 10:40 )    Color: x / Appearance: x / SG: x / pH: x  Gluc: 278 mg/dL / Ketone: x  / Bili: x / Urobili: x   Blood: x / Protein: x / Nitrite: x   Leuk Esterase: x / RBC: x / WBC x   Sq Epi: x / Non Sq Epi: x / Bacteria: x      LIVER FUNCTIONS - ( 24 Apr 2025 10:40 )  Alb: 3.7 g/dL / Pro: 8.2 g/dL / ALK PHOS: 82 U/L / ALT: 24 U/L / AST: 12 U/L / GGT: x               RADIOLOGY & ADDITIONAL STUDIES:  4/24/2025 CT A/P  FINDINGS:  LOWER CHEST: Dependent atelectatic changes. Tiny hiatal hernia    LIVER: Within normal limits.  BILE DUCTS: Normal caliber.  GALLBLADDER: Cholelithiasis. Pericholecystic stranding raising possibility of cholecystitis. Correlate with symptomatology and right upper quadrant ultrasound..  SPLEEN: Within normal limits.  PANCREAS: Within normal limits.  ADRENALS: Within normal limits.  KIDNEYS/URETERS: Left renal cyst. No hydronephrosis. Bilateral nonspecific perinephric stranding    BLADDER: Markedly distended without wall thickening  REPRODUCTIVE ORGANS: Normal prostate    BOWEL: No bowel obstruction or active inflammation. Fluid and gas-filled periampullary duodenal diverticulum. Appendix no pericecal inflammation to suggest appendicitis  PERITONEUM/RETROPERITONEUM: Within normal limits.  VESSELS: Nonaneurysmal.  LYMPH NODES: No lymphadenopathy.  ABDOMINAL WALL: Mild widening bilateral inguinal ring with fat. Small fat-containing umbilical hernia  BONES: Degenerative changes. Mild loss of height of T12 vertebral body probably chronic    IMPRESSION:  Cholelithiasis with pericholecystic stranding. Cholecystitis is a consideration. Correlate with the right upper quadrant ultrasound

## 2025-04-24 NOTE — CONSULT NOTE ADULT - ASSESSMENT
59M with history of Gilbert syndrome, cholelithiasis, T2DM, HLD who underwent EGD 4/23 for diagnostic evaluation of loose stool and several hours later developed mild RUQ pain and nausea. VSSAF. Labs notable for WBC 18.4 w/ left shift, Tbili 3.4, normal lipase. CT showed cholelithiasis and some pericholecystic fat stranding. Abdominal ultrasound pending.    - f/u abdominal ultrasound  - NPO/IVF  - Pain control as needed  - Surgery will follow until ultrasound results  Discussed with attending Dr. Lawrence  59M with history of Gilbert syndrome, cholelithiasis, T2DM, HLD who underwent EGD 4/23 for diagnostic evaluation of loose stool and several hours later developed mild RUQ pain and nausea. VSSAF. Labs notable for WBC 18.4 w/ left shift, Tbili 3.4, normal lipase. CT showed cholelithiasis and some pericholecystic fat stranding. Abdominal ultrasound with GB wall 4mm, negative sonographic Anderson's.     - f/u HIDA scan  - NPO/IVF  - Pain control as needed  Discussed with attending Dr. Lawrence  59M with history of Gilbert syndrome, cholelithiasis, T2DM, HLD who underwent EGD 4/23 for diagnostic evaluation of loose stool and several hours later developed mild RUQ pain and nausea. VSSAF. Labs notable for WBC 18.4 w/ left shift, Tbili 3.4, normal lipase. CT showed cholelithiasis and some pericholecystic fat stranding. Abdominal ultrasound with GB wall 4mm, negative sonographic Anderson's.     - HIDA scan  - Zosyn   - NPO/IVF  - Pain control as needed  Discussed with attending Dr. Lawrence

## 2025-04-25 ENCOUNTER — RESULT REVIEW (OUTPATIENT)
Age: 60
End: 2025-04-25

## 2025-04-25 ENCOUNTER — TRANSCRIPTION ENCOUNTER (OUTPATIENT)
Age: 60
End: 2025-04-25

## 2025-04-25 DIAGNOSIS — E10.65 TYPE 1 DIABETES MELLITUS WITH HYPERGLYCEMIA: ICD-10-CM

## 2025-04-25 DIAGNOSIS — K81.9 CHOLECYSTITIS, UNSPECIFIED: ICD-10-CM

## 2025-04-25 LAB
A1C WITH ESTIMATED AVERAGE GLUCOSE RESULT: 8.9 % — HIGH (ref 4–5.6)
ALBUMIN SERPL ELPH-MCNC: 3.1 G/DL — LOW (ref 3.3–5)
ALP SERPL-CCNC: 77 U/L — SIGNIFICANT CHANGE UP (ref 40–120)
ALT FLD-CCNC: 35 U/L — SIGNIFICANT CHANGE UP (ref 12–78)
ANION GAP SERPL CALC-SCNC: 6 MMOL/L — SIGNIFICANT CHANGE UP (ref 5–17)
APTT BLD: 28.8 SEC — SIGNIFICANT CHANGE UP (ref 26.1–36.8)
AST SERPL-CCNC: 20 U/L — SIGNIFICANT CHANGE UP (ref 15–37)
BASOPHILS # BLD AUTO: 0.09 K/UL — SIGNIFICANT CHANGE UP (ref 0–0.2)
BASOPHILS NFR BLD AUTO: 0.5 % — SIGNIFICANT CHANGE UP (ref 0–2)
BILIRUB SERPL-MCNC: 4.4 MG/DL — HIGH (ref 0.2–1.2)
BUN SERPL-MCNC: 11 MG/DL — SIGNIFICANT CHANGE UP (ref 7–23)
CALCIUM SERPL-MCNC: 8.9 MG/DL — SIGNIFICANT CHANGE UP (ref 8.5–10.1)
CHLORIDE SERPL-SCNC: 101 MMOL/L — SIGNIFICANT CHANGE UP (ref 96–108)
CO2 SERPL-SCNC: 24 MMOL/L — SIGNIFICANT CHANGE UP (ref 22–31)
CREAT SERPL-MCNC: 1.1 MG/DL — SIGNIFICANT CHANGE UP (ref 0.5–1.3)
EGFR: 76 ML/MIN/1.73M2 — SIGNIFICANT CHANGE UP
EGFR: 76 ML/MIN/1.73M2 — SIGNIFICANT CHANGE UP
EOSINOPHIL # BLD AUTO: 0.01 K/UL — SIGNIFICANT CHANGE UP (ref 0–0.5)
EOSINOPHIL NFR BLD AUTO: 0.1 % — SIGNIFICANT CHANGE UP (ref 0–6)
ESTIMATED AVERAGE GLUCOSE: 209 MG/DL — HIGH (ref 68–114)
GLUCOSE SERPL-MCNC: 256 MG/DL — HIGH (ref 70–99)
HCT VFR BLD CALC: 42.2 % — SIGNIFICANT CHANGE UP (ref 39–50)
HGB BLD-MCNC: 14.7 G/DL — SIGNIFICANT CHANGE UP (ref 13–17)
IMM GRANULOCYTES NFR BLD AUTO: 0.8 % — SIGNIFICANT CHANGE UP (ref 0–0.9)
INR BLD: 1.11 RATIO — SIGNIFICANT CHANGE UP (ref 0.85–1.16)
LYMPHOCYTES # BLD AUTO: 1.17 K/UL — SIGNIFICANT CHANGE UP (ref 1–3.3)
LYMPHOCYTES # BLD AUTO: 6.2 % — LOW (ref 13–44)
MCHC RBC-ENTMCNC: 28.9 PG — SIGNIFICANT CHANGE UP (ref 27–34)
MCHC RBC-ENTMCNC: 34.8 G/DL — SIGNIFICANT CHANGE UP (ref 32–36)
MCV RBC AUTO: 83.1 FL — SIGNIFICANT CHANGE UP (ref 80–100)
MONOCYTES # BLD AUTO: 1.43 K/UL — HIGH (ref 0–0.9)
MONOCYTES NFR BLD AUTO: 7.6 % — SIGNIFICANT CHANGE UP (ref 2–14)
NEUTROPHILS # BLD AUTO: 16.01 K/UL — HIGH (ref 1.8–7.4)
NEUTROPHILS NFR BLD AUTO: 84.8 % — HIGH (ref 43–77)
NRBC BLD AUTO-RTO: 0 /100 WBCS — SIGNIFICANT CHANGE UP (ref 0–0)
PLATELET # BLD AUTO: 196 K/UL — SIGNIFICANT CHANGE UP (ref 150–400)
POTASSIUM SERPL-MCNC: 3.9 MMOL/L — SIGNIFICANT CHANGE UP (ref 3.5–5.3)
POTASSIUM SERPL-SCNC: 3.9 MMOL/L — SIGNIFICANT CHANGE UP (ref 3.5–5.3)
PROT SERPL-MCNC: 7.3 G/DL — SIGNIFICANT CHANGE UP (ref 6–8.3)
PROTHROM AB SERPL-ACNC: 13 SEC — SIGNIFICANT CHANGE UP (ref 9.9–13.4)
RBC # BLD: 5.08 M/UL — SIGNIFICANT CHANGE UP (ref 4.2–5.8)
RBC # FLD: 12.8 % — SIGNIFICANT CHANGE UP (ref 10.3–14.5)
SODIUM SERPL-SCNC: 131 MMOL/L — LOW (ref 135–145)
WBC # BLD: 18.86 K/UL — HIGH (ref 3.8–10.5)
WBC # FLD AUTO: 18.86 K/UL — HIGH (ref 3.8–10.5)

## 2025-04-25 PROCEDURE — 47563 LAPARO CHOLECYSTECTOMY/GRAPH: CPT | Mod: 22

## 2025-04-25 PROCEDURE — 99223 1ST HOSP IP/OBS HIGH 75: CPT | Mod: 57

## 2025-04-25 PROCEDURE — 47563 LAPARO CHOLECYSTECTOMY/GRAPH: CPT | Mod: 80,22

## 2025-04-25 PROCEDURE — 88304 TISSUE EXAM BY PATHOLOGIST: CPT | Mod: 26

## 2025-04-25 DEVICE — CLIP APPLIER COVIDIEN ENDOCLIP 10MM LARGE: Type: IMPLANTABLE DEVICE | Status: FUNCTIONAL

## 2025-04-25 DEVICE — LIGATING CLIPS WECK HEMOLOK POLYMER EXTRA LARGE (GOLD) 6: Type: IMPLANTABLE DEVICE | Status: FUNCTIONAL

## 2025-04-25 DEVICE — CLIP APPLIER COVIDIEN ENDOCLIP II 10MM MED/LG: Type: IMPLANTABLE DEVICE | Status: FUNCTIONAL

## 2025-04-25 DEVICE — ARISTA 3GR: Type: IMPLANTABLE DEVICE | Status: FUNCTIONAL

## 2025-04-25 RX ORDER — ACETAMINOPHEN 500 MG/5ML
1000 LIQUID (ML) ORAL ONCE
Refills: 0 | Status: DISCONTINUED | OUTPATIENT
Start: 2025-04-25 | End: 2025-04-25

## 2025-04-25 RX ORDER — GLUCAGON 3 MG/1
1 POWDER NASAL ONCE
Refills: 0 | Status: DISCONTINUED | OUTPATIENT
Start: 2025-04-25 | End: 2025-04-28

## 2025-04-25 RX ORDER — INSULIN ASPART 100 [IU]/ML
1 INJECTION, SOLUTION INTRAVENOUS; SUBCUTANEOUS
Refills: 0 | Status: DISCONTINUED | OUTPATIENT
Start: 2025-04-25 | End: 2025-04-28

## 2025-04-25 RX ORDER — MELATONIN 5 MG
3 TABLET ORAL AT BEDTIME
Refills: 0 | Status: DISCONTINUED | OUTPATIENT
Start: 2025-04-25 | End: 2025-04-25

## 2025-04-25 RX ORDER — INSULIN LISPRO 100 U/ML
INJECTION, SOLUTION INTRAVENOUS; SUBCUTANEOUS AT BEDTIME
Refills: 0 | Status: DISCONTINUED | OUTPATIENT
Start: 2025-04-25 | End: 2025-04-28

## 2025-04-25 RX ORDER — TAMSULOSIN HYDROCHLORIDE 0.4 MG/1
0.4 CAPSULE ORAL AT BEDTIME
Refills: 0 | Status: DISCONTINUED | OUTPATIENT
Start: 2025-04-25 | End: 2025-04-28

## 2025-04-25 RX ORDER — KETOROLAC TROMETHAMINE 30 MG/ML
15 INJECTION, SOLUTION INTRAMUSCULAR; INTRAVENOUS EVERY 6 HOURS
Refills: 0 | Status: DISCONTINUED | OUTPATIENT
Start: 2025-04-25 | End: 2025-04-26

## 2025-04-25 RX ORDER — INSULIN ASPART 100 [IU]/ML
1 INJECTION, SOLUTION INTRAVENOUS; SUBCUTANEOUS
Refills: 0 | Status: DISCONTINUED | OUTPATIENT
Start: 2025-04-25 | End: 2025-04-25

## 2025-04-25 RX ORDER — ATORVASTATIN CALCIUM 80 MG/1
10 TABLET, FILM COATED ORAL AT BEDTIME
Refills: 0 | Status: DISCONTINUED | OUTPATIENT
Start: 2025-04-25 | End: 2025-04-28

## 2025-04-25 RX ORDER — SODIUM CHLORIDE 9 G/1000ML
1000 INJECTION, SOLUTION INTRAVENOUS
Refills: 0 | Status: DISCONTINUED | OUTPATIENT
Start: 2025-04-25 | End: 2025-04-25

## 2025-04-25 RX ORDER — PIPERACILLIN-TAZO-DEXTROSE,ISO 2.25G/50ML
3.38 IV SOLUTION, PIGGYBACK PREMIX FROZEN(ML) INTRAVENOUS EVERY 8 HOURS
Refills: 0 | Status: DISCONTINUED | OUTPATIENT
Start: 2025-04-25 | End: 2025-04-28

## 2025-04-25 RX ORDER — ONDANSETRON HCL/PF 4 MG/2 ML
4 VIAL (ML) INJECTION ONCE
Refills: 0 | Status: DISCONTINUED | OUTPATIENT
Start: 2025-04-25 | End: 2025-04-25

## 2025-04-25 RX ORDER — ONDANSETRON HCL/PF 4 MG/2 ML
4 VIAL (ML) INJECTION EVERY 8 HOURS
Refills: 0 | Status: DISCONTINUED | OUTPATIENT
Start: 2025-04-25 | End: 2025-04-28

## 2025-04-25 RX ORDER — ACETAMINOPHEN 500 MG/5ML
1000 LIQUID (ML) ORAL EVERY 6 HOURS
Refills: 0 | Status: COMPLETED | OUTPATIENT
Start: 2025-04-26 | End: 2025-04-26

## 2025-04-25 RX ORDER — INSULIN GLARGINE-YFGN 100 [IU]/ML
12 INJECTION, SOLUTION SUBCUTANEOUS ONCE
Refills: 0 | Status: COMPLETED | OUTPATIENT
Start: 2025-04-25 | End: 2025-04-25

## 2025-04-25 RX ORDER — ACETAMINOPHEN 500 MG/5ML
1000 LIQUID (ML) ORAL ONCE
Refills: 0 | Status: COMPLETED | OUTPATIENT
Start: 2025-04-25 | End: 2025-04-25

## 2025-04-25 RX ORDER — HYDROMORPHONE/SOD CHLOR,ISO/PF 2 MG/10 ML
0.5 SYRINGE (ML) INJECTION
Refills: 0 | Status: DISCONTINUED | OUTPATIENT
Start: 2025-04-25 | End: 2025-04-25

## 2025-04-25 RX ORDER — INSULIN LISPRO 100 U/ML
INJECTION, SOLUTION INTRAVENOUS; SUBCUTANEOUS
Refills: 0 | Status: DISCONTINUED | OUTPATIENT
Start: 2025-04-25 | End: 2025-04-28

## 2025-04-25 RX ORDER — INSULIN LISPRO 100 U/ML
INJECTION, SOLUTION INTRAVENOUS; SUBCUTANEOUS EVERY 6 HOURS
Refills: 0 | Status: DISCONTINUED | OUTPATIENT
Start: 2025-04-25 | End: 2025-04-25

## 2025-04-25 RX ADMIN — Medication 400 MILLIGRAM(S): at 17:42

## 2025-04-25 RX ADMIN — Medication 1000 MILLIGRAM(S): at 18:00

## 2025-04-25 RX ADMIN — ATORVASTATIN CALCIUM 10 MILLIGRAM(S): 80 TABLET, FILM COATED ORAL at 21:20

## 2025-04-25 RX ADMIN — Medication 400 MILLIGRAM(S): at 10:11

## 2025-04-25 RX ADMIN — KETOROLAC TROMETHAMINE 15 MILLIGRAM(S): 30 INJECTION, SOLUTION INTRAMUSCULAR; INTRAVENOUS at 21:57

## 2025-04-25 RX ADMIN — INSULIN LISPRO 4: 100 INJECTION, SOLUTION INTRAVENOUS; SUBCUTANEOUS at 18:53

## 2025-04-25 RX ADMIN — Medication 25 GRAM(S): at 05:09

## 2025-04-25 RX ADMIN — Medication 40 MILLIGRAM(S): at 18:45

## 2025-04-25 RX ADMIN — INSULIN GLARGINE-YFGN 12 UNIT(S): 100 INJECTION, SOLUTION SUBCUTANEOUS at 11:53

## 2025-04-25 RX ADMIN — Medication 1000 MILLIGRAM(S): at 10:41

## 2025-04-25 RX ADMIN — TAMSULOSIN HYDROCHLORIDE 0.4 MILLIGRAM(S): 0.4 CAPSULE ORAL at 21:20

## 2025-04-25 RX ADMIN — SODIUM CHLORIDE 75 MILLILITER(S): 9 INJECTION, SOLUTION INTRAVENOUS at 17:42

## 2025-04-25 RX ADMIN — Medication 25 GRAM(S): at 21:22

## 2025-04-25 RX ADMIN — Medication 25 GRAM(S): at 14:06

## 2025-04-25 RX ADMIN — KETOROLAC TROMETHAMINE 15 MILLIGRAM(S): 30 INJECTION, SOLUTION INTRAMUSCULAR; INTRAVENOUS at 22:57

## 2025-04-25 RX ADMIN — Medication 40 MILLIGRAM(S): at 05:09

## 2025-04-25 RX ADMIN — INDOCYANINE GREEN AND WATER 1.5 MILLIGRAM(S): KIT at 13:30

## 2025-04-25 RX ADMIN — INSULIN LISPRO 3: 100 INJECTION, SOLUTION INTRAVENOUS; SUBCUTANEOUS at 21:58

## 2025-04-25 NOTE — CARE COORDINATION ASSESSMENT. - OTHER PERTINENT DISCHARGE PLANNING INFORMATION:
CM met with the patient at the bedside and explained role of CM and transition planning. Patient verbalized understanding. Patient lives with his spouse in a private home. No stairs outside; flight of stairs inside. Independent with ADLs/ambulation/driving PTA. No DME or home care services PTA. Patient manages his Insulin pump and continuous blood glucose monitoring (Dexcom 6) independently. PCP: Dr. Jose R De La Garza Pharmacy: [facility]. CM provided direct contact/resource folder and remains available.

## 2025-04-25 NOTE — CONSULT NOTE ADULT - PROBLEM SELECTOR RECOMMENDATION 9
Type 1.5 A1c pending% adm cholecystitis  Recommend endocrine-Perlman on consult  FU appt: TBA  DSC recommendations: return to home regimen and glucose monitoring  diabetes education provided  Goal 100-180 mg/dL; 140-180 mg/dL in critical care areas Type 1.5 A1c pending% adm cholecystitis  Recommend endocrine-Perlman on consult  omni pod pump infusing as per above settings- will start lantus 2 hours prior to removal pump  FU appt: TBA  DSC recommendations: return to home regimen and glucose monitoring  diabetes education provided  Goal 100-180 mg/dL; 140-180 mg/dL in critical care areas

## 2025-04-25 NOTE — CONSULT NOTE ADULT - PROBLEM SELECTOR PROBLEM 1
Uncontrolled type 1 diabetes mellitus with hyperglycemia
Uncontrolled type 1 diabetes mellitus with hyperglycemia

## 2025-04-25 NOTE — PROGRESS NOTE ADULT - SUBJECTIVE AND OBJECTIVE BOX
Patient is a 59y old  Male who presents with a chief complaint of       INTERVAL HPI/OVERNIGHT EVENTS:   no complaints  pt seen and examined         Vital Signs Last 24 Hrs  T(C): 36.8 (25 Apr 2025 17:00), Max: 38.1 (25 Apr 2025 08:12)  T(F): 98.2 (25 Apr 2025 17:00), Max: 100.6 (25 Apr 2025 08:12)  HR: 109 (25 Apr 2025 17:30) (102 - 129)  BP: 142/82 (25 Apr 2025 17:30) (140/71 - 159/67)  BP(mean): --  RR: 25 (25 Apr 2025 17:30) (18 - 26)  SpO2: 100% (25 Apr 2025 17:30) (90% - 100%)    Parameters below as of 25 Apr 2025 12:44  Patient On (Oxygen Delivery Method): room air        acetaminophen   IVPB .. 1000 milliGRAM(s) IV Intermittent once  atorvastatin 10 milliGRAM(s) Oral at bedtime  glucagon  Injectable 1 milliGRAM(s) IntraMuscular once  HYDROmorphone  Injectable 0.5 milliGRAM(s) IV Push every 10 minutes PRN  insulin aspart (NovoLOG) Pump 1 Each SubCutaneous Continuous Pump  insulin lispro (ADMELOG) corrective regimen sliding scale   SubCutaneous every 6 hours  lactated ringers. 1000 milliLiter(s) IV Continuous <Continuous>  ondansetron Injectable 4 milliGRAM(s) IV Push every 8 hours PRN  ondansetron Injectable 4 milliGRAM(s) IV Push once PRN  pantoprazole    Tablet 40 milliGRAM(s) Oral two times a day  piperacillin/tazobactam IVPB.. 3.375 Gram(s) IV Intermittent every 8 hours  tamsulosin 0.4 milliGRAM(s) Oral at bedtime      PHYSICAL EXAM:  GENERAL: NAD   EYES: conjunctiva and sclera clear  ENMT: Moist mucous membranes  NECK: Supple, No JVD, Normal thyroid  CHEST/LUNG: non labored, cta b/l  HEART: Regular rate and rhythm; No murmurs, rubs, or gallops  ABDOMEN: Soft, Nontender, Nondistended; Bowel sounds present  EXTREMITIES:  No clubbing, no cyanosis, no edema  LYMPH: No lymphadenopathy noted  SKIN: No rashes or lesions  NEURO: no new focal deficits    Consultant(s) Notes Reviewed:  [x ] YES  [ ] NO  Care Discussed with Consultants/Other Providers [ x] YES  [ ] NO    LABS:                        14.7   18.86 )-----------( 196      ( 25 Apr 2025 07:01 )             42.2     04-25    131[L]  |  101  |  11  ----------------------------<  256[H]  3.9   |  24  |  1.10    Ca    8.9      25 Apr 2025 07:01    TPro  7.3  /  Alb  3.1[L]  /  TBili  4.4[H]  /  DBili  x   /  AST  20  /  ALT  35  /  AlkPhos  77  04-25    PT/INR - ( 25 Apr 2025 07:01 )   PT: 13.0 sec;   INR: 1.11 ratio         PTT - ( 25 Apr 2025 07:01 )  PTT:28.8 sec  Urinalysis Basic - ( 25 Apr 2025 07:01 )    Color: x / Appearance: x / SG: x / pH: x  Gluc: 256 mg/dL / Ketone: x  / Bili: x / Urobili: x   Blood: x / Protein: x / Nitrite: x   Leuk Esterase: x / RBC: x / WBC x   Sq Epi: x / Non Sq Epi: x / Bacteria: x      CAPILLARY BLOOD GLUCOSE      POCT Blood Glucose.: 255 mg/dL (25 Apr 2025 16:36)  POCT Blood Glucose.: 235 mg/dL (25 Apr 2025 11:51)  POCT Blood Glucose.: 183 mg/dL (25 Apr 2025 08:47)        Urinalysis Basic - ( 25 Apr 2025 07:01 )    Color: x / Appearance: x / SG: x / pH: x  Gluc: 256 mg/dL / Ketone: x  / Bili: x / Urobili: x   Blood: x / Protein: x / Nitrite: x   Leuk Esterase: x / RBC: x / WBC x   Sq Epi: x / Non Sq Epi: x / Bacteria: x          RADIOLOGY & ADDITIONAL TESTS:    Imaging Personally Reviewed  Reviewed consultants input

## 2025-04-25 NOTE — CONSULT NOTE ADULT - ASSESSMENT
Physical Exam:   Vital Signs Last 24 Hrs  T(C): 37.3 (25 Apr 2025 05:57), Max: 37.3 (24 Apr 2025 22:21)  T(F): 99.2 (25 Apr 2025 05:57), Max: 99.2 (24 Apr 2025 22:21)  HR: 106 (25 Apr 2025 05:57) (102 - 120)  BP: 151/75 (25 Apr 2025 05:57) (151/75 - 159/67)  BP(mean): --  RR: 18 (25 Apr 2025 05:57) (17 - 20)  SpO2: 92% (25 Apr 2025 05:57) (92% - 95%)    Parameters below as of 25 Apr 2025 05:57  Patient On (Oxygen Delivery Method): room air             CAPILLARY BLOOD GLUCOSE      POCT Blood Glucose.: 183 mg/dL (25 Apr 2025 08:47)      Cholesterol, Serum: 113 mg/dL (05.19.21 @ 08:36)     HDL Cholesterol, Serum: 22 mg/dL (05.19.21 @ 08:36)     LDL Cholesterol Calculated: 66 mg/dL (05.19.21 @ 08:36)     DIET: NPO

## 2025-04-25 NOTE — CARE COORDINATION ASSESSMENT. - NSCAREPROVIDERS_GEN_ALL_CORE_FT
CARE PROVIDERS:  Accepting Physician: Dank Ballard  Administration: Jermaine Machado  Administration: Alo Smith  Admitting: Doctor, Unknown  Attending: , Unknown  Case Management: Sharon Ramírez  Consultant: Elvis Lawrence  Consultant: Perlman, Craig  Consultant: Weil, Patricia  Consultant: Daniel Millan  Consultant: Hi Spivey  Consultant: Latrice Randolph  ED Attending: Kunal Gonzalez ED Nurse: Wu Pérez  Nurse: Darvin Martinez  Nurse: Jacqueline White  Nurse: Angle Bonilla  Nurse: Stacey Dominguez  Nurse: Twyla Horner  Nurse: Yanet Strange  Outpatient Provider: Dank Ballard  Override: Darvin Lovell  Override: Darvin Martinez  Override: Kyra Blanca  PCA/Nursing Assistant: Osmany Gage  Primary Team: Magan Dewey  Registered Dietitian: Lily Hampton  : Vane Campos// Supp. Assoc.: Darvin Lovell

## 2025-04-25 NOTE — PROGRESS NOTE ADULT - ASSESSMENT
59M PMHx Gilbert syndrome, diabetes, HLD who presents with RUQ pain and nausea found to have positive HIDA scan consistent with acute cholecystitis. Plan for OR today.    - OR today for laparoscopic cholecystectomy  - Pain control as needed   -- Per patient request, avoid opioid/narcotics  -- Avoid NSAIDs given reported gastric ulcer  - NPO/IVF  - Zosyn  - Encourage ambulation and IS  - DVT ppx: SCDs  Plan to be discussed with attending Dr. Lawrence  59M PMHx Gilbert syndrome, diabetes, HLD who presents with RUQ pain and nausea found to have positive HIDA scan consistent with acute cholecystitis. Plan for OR today.    - OR today for laparoscopic cholecystectomy  - Pain control as needed   -- Per patient request, avoid opioid/narcotics  -- Avoid NSAIDs given reported gastric ulcer  - NPO/IVF  - Zosyn  - Encourage ambulation and IS  - DVT ppx: SCDs  Plan to be discussed with attending Dr. Lawrence     Mr. Feliciano was seen preoperatively in the holding area.    The history was confirmed.  An examination was personally performed confirming tenderness to the RUQ.    The imaging reports and associated images from Sono, CT and HIDA were reviewed.  The risks, benefits and nature of the operation were discussed at length with patient in person and wife (RN) by phone.    These points included, but were not limited to the possibility of conversion to open surgery, the possibility of intra-operative imaging or the performance of a cholangiogram, the possibility of post-operative intra-abdominal drains, the possibility of biliary tract, visceral or vascular injury, the possibility of residual abdominal wall deformity, the possibility of infection or  abscess or post-operative collection, and the approximate size, number and location of the typical or expected operative incisions.    We have discussed that subtotal or partial cholecystectomy, cholecystostomy with drainage or a discontinued diagnostic laparoscopy is also possible pending the intra-operative findings with possibility of post-op drains and/ OR ERCP.    We have discussed that all abdominal surgery maintains the possibilities of future abdominal wall herniation, future intra-abdominal adhesions with the risk of bowel obstruction and increased risk during future operative interventions.    We have detailed that medical complications unrelated to the specific operative procedure are possible.    We have specifically discussed the possibility of Post Cholecystectomy Syndrome with residual abdominal pain or post-prandial gastrointestinal complaints.    We have discussed that this is a process which may be amenable to conservative management or may ultimately require further future gastroenterology evaluation.    We have discussed that Post Cholecystectomy Syndrome complaints may require long term dietary modifications with or without supplemental prescription medications.    We have reviewed that general anesthesia with intubation will be required and that pending post-operative progress further inpatient care may be required.    Mr. Feliciano demonstrates good understanding and his wife is eager to proceed.

## 2025-04-25 NOTE — CONSULT NOTE ADULT - PROBLEM SELECTOR RECOMMENDATION 9
cont omnipod 5 insulin pump settings  currently on manual mode during npo status  cont dextrose ivfs while npo  diabetes education in process  goal bg 100-180 in hosp setting

## 2025-04-25 NOTE — PROGRESS NOTE ADULT - ASSESSMENT
59m in recovery x 30 years with DM on insulin pump, BPH, HLD,  p/w acute cholecystitis  positive HIDA  US and CT consistent with acute cholecystitis  surgery following  s/p lap melanie    pain  iv tylenol  pt wants to avoid narcotics    DM  diabetic diet  fingersticks  continue insulin pump  endocrine consulted    BPH  continue flomax    HLD  continue lipitor

## 2025-04-25 NOTE — PROVIDER CONTACT NOTE (OTHER) - NAME OF MD/NP/PA/DO NOTIFIED:
2018    From the office of:   Paddy Fagan MD   [facility]  [address] [address]  Phone: [phone]  Fax: [phone]    In regards to Hermelinda CESAR Ratna, :  1996    Most recent gynecologic oncology visit: Surgery 18    Chief Complaint/Reason for Visit:  Mucinous borderline tumor of right ovary.       History of Present Illness:  Pt is a 22 year old year old female who, on 18, underwent a laparoscopic right ovarian cystectomy performed by Dr. Rigoberto Khan for a persistent complex right ovarian cyst.     18 pathology revealed:  -Right ovarian cyst capsule:   -Mucinous borderline tumor, intestinal type.     At her postop appointment on 18 it was noted, \"Explained the difference between borderline tumor and ovarian cancer. Explained that she do not need need for chemotherapy considering type of tumor. Explained necessity additional surgery to provide adequate treatment.  Her right adnexa, appendix, possible omentum likely needs to be removed laparoscopically. This surgery needs to be provided by expert in gynecologic malignancy.\"    Subsequently, she was brought to the operating room on 18 where I performed a robotic-assisted laparoscopic right salpingo-oophorectomy, therapeutic appendectomy, resection of pelvic peritoneal implants and partial omentectomy.     18 pathology revealed:  -Omentum, partial omentectomy:      - Benign omental tissue.      - No tumor identified.     -Soft tissue, right pelvic sidewall, biopsy:      - Benign mesothelial-lined fibrous tissue.      - No tumor identified.     -Soft tissue, sigmoid mesentery, biopsy:      - Benign fibroadipose tissue with foreign body giant cell reaction.      - No tumor identified.     -Soft tissue, cul-de-sac, biopsy:      - Benign mesothelial-lined fibroadipose tissue with foreign body giant cell     reaction.      - No tumor identified.     -Appendix,  appendectomy:      - Benign appendix with fibrous obliteration of the tip.      - No tumor identified.     -Ovary and fallopian tube, right, salpingo-oophorectomy:      - Benign ovary with prior procedure-related changes including fibrous serosal adhesions.      - Fallopian tube with no significant histopathologic abnormality.      - No tumor identified.   -Pelvic washings: Negative for malignancy.     She now presents for follow up. She notes no change in bowel or bladder function, no leg swelling. No pain. Some bloating. No vaginal bleeding or discharge. Her weight is stable. She notes no appetite issues. There is no nausea or vomiting. On her review of systems see only complaints are borderline constipation, diarrhea and bloating. Not severe.    Review of Systems:  A 14 item review of systems was performed and all responses were negative except as noted in the above \"History of Present Illness\".     Distress Thermometer:  . No reason given.    Medications:  Current Outpatient Prescriptions   Medication Sig   • pantoprazole (PROTONIX) 40 MG tablet Take 1 tablet by mouth daily.   • MedroxyPROGESTERone Acetate (DEPO-PROVERA IM) Inject into the muscle every 3 months.    • amphetamine-dextroamphetamine (ADDERALL) 30 MG tablet Take 30 mg by mouth 2 times daily.    • HYDROcodone-acetaminophen (NORCO) 5-325 MG per tablet Take 1 tablet by mouth every 6 hours as needed for Pain.     No current facility-administered medications for this visit.      Please see EPIC for a complete list of medications.    Allergies:  ALLERGIES: no known allergies.    Past Medical History:  Past Medical History:   Diagnosis Date   • ADHD (attention deficit hyperactivity disorder)    • Gastroesophageal reflux disease with esophagitis 2018   • GERD (gastroesophageal reflux disease)    • Ovarian cyst    • PONV (postoperative nausea and vomiting)      Past Surgical History:  Past Surgical History:   Procedure Laterality Date   •   section, low transverse  06/11/2016   • Colonoscopy  07/18/2018    Dr. De Guzman   • Esophagogastroduodenoscopy  06/22/2018    Dr. De Guzman   • Ovarian cyst removal  06/28/2018   • Ovarian cyst removal Right 06/28/2018   • Tonsillectomy and adenoidectomy  03/08/2013   • Upper gastrointestinal endoscopy       Family History:  Family History   Problem Relation Age of Onset   • Hyperlipidemia Mother    • High blood pressure Father    • Cancer Maternal Grandmother 30        breast/ovarian   • Diabetes Maternal Grandfather    • Cancer Maternal Grandfather         skin   • Emphysema Paternal Grandmother    • Heart disease Paternal Grandfather      Social History:   Social History     Social History   • Marital status: Single     Spouse name: N/A   • Number of children: N/A   • Years of education: N/A     Occupational History   •  at [employer]      Social History Main Topics   • Smoking status: Former Smoker     Packs/day: 0.25     Years: 4.00     Types: Cigarettes     Quit date: 10/2015   • Smokeless tobacco: Never Used   • Alcohol use 0.0 oz/week      Comment: occasional   • Drug use: No   • Sexual activity: Not Currently     Other Topics Concern   • Not on file     Social History Narrative    Hermelinda has a son named Ted. 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         PHYSICAL EXAM  Constitutional:   General appearance:  Well-developed, well-nourished female in no obvious distress.   Vitals:    Visit Vitals  /82   Pulse 120   Temp 98.7 °F (37.1 °C) (Temporal Artery)   Resp 20   Ht 5' 4\" (1.626 m)   Wt 74 kg   BMI 28.01 kg/m²     Neurological/Psychiatric:  Orientation: Alert to time, place and person. Mood and affect: Appears appropriate.  Neck:  Appears normal, with no masses, asymmetry, with a midline trachea. The thyroid is not enlarged, tender or with any masses.  Skin: Multiple tattoos including her right foot, bilateral hips, as well as other areas. No rashes, lesions or  ulcers.  Respiratory:  Good respiratory effort, with no use of accessory muscles.  Good diaphram movement.  Lungs are clear to auscultation with no abnormal breath sounds.  Cardiovascular: Auscultation of heart reveals no abnormal sounds or murmurs. The peripheral vascular system shows full pulses and no edema, or tenderness.  Chest (breasts):  Please see \"Genitourinary\" below  Gastrointestinal (Abdomen): Healing laparoscopic incisions. Healed Pfannenstiel incision. Healing laparoscopic incisions, multiple, including umbilicus, right and left lower quadrant a midline suprapubic. No masses, tenderness, hernia, or hepatosplenomegaly. Stool sample for occult blood test not indicated.  Lymphatic: No palpable lymph nodes in neck, axillae, or groin.  Genitourinary:    Breasts are not examined.   Pelvic exam   External genitalia: Normal appearance with no lesions.   Urethra meatus is of normal size and location, with no lesions or prolapse.   Urethra shows no masses, tenderness or scarring.   Bladder shows no tenderness, fullness or masses.   Vagina has normal appearance, with no discharge, lesions, cystocele, rectocele or prolapse.   Cervix: Normal size, shape, color. No cervical motion tenderness.   Uterus:  Size: Small  Contour: Smooth  Position: Mid  Tenderness: Minimally tender  Support: Well supported   Adnexa/Parametria: Right side surgically absent. No masses, tenderness, enlargement or nodularity.   Anus and Perineum: No lesions noted.  Digital rectal exam: Good sphincter tone, no masses. No obvious hemorrhoids.     Data Reviewed:  Pathology, Office notes and Operative reports    Tests Ordered:  Baseline CT of chest/abdomen/pelvis in one month.  CEA with visit in 6 months.    Assessment and Plan:   Mucinous borderline tumor, intestinal type, , stage IC1 of right ovary.  She has no evidence of any disease at this point. She has excellent postoperative recovery. She was given a copy of her pathology report. She will  have the baseline CT scan as noted above. I told her there is a 15% chance this can recur and if it does recur a 15% chance that it would recur as a cancer. She will follow up every 6 months ×4 (out until August 2020), then annual exams. She will have a CEA with each exam. She was encouraged to call me should she have any questions or concerns.    Dr. Khan, thank you so much for allowing me to help you care for Ms. Segundo.  Please contact me should you have any questions or concerns whatsoever. I look forward to being of further service to you and will continue to keep you informed of any and all subsequent developments in her care from my standpoint.         Dr. Kelly

## 2025-04-25 NOTE — PROGRESS NOTE ADULT - SUBJECTIVE AND OBJECTIVE BOX
SURGERY NOTE ON BEHALF OF DR. PRESTON / GENERAL SURGERY:    S: Patient seen and examined at bedside.  Patient reports abdominal pain controllable with tylenol. NPO since midnight.     MEDICATIONS:  acetaminophen     Tablet .. 650 milliGRAM(s) Oral every 6 hours PRN  acetaminophen   IVPB .. 1000 milliGRAM(s) IV Intermittent every 8 hours PRN  aluminum hydroxide/magnesium hydroxide/simethicone Suspension 30 milliLiter(s) Oral every 4 hours PRN  atorvastatin 10 milliGRAM(s) Oral at bedtime  dextrose 5% + sodium chloride 0.45%. 1000 milliLiter(s) IV Continuous <Continuous>  dextrose 5%. 1000 milliLiter(s) IV Continuous <Continuous>  dextrose 5%. 1000 milliLiter(s) IV Continuous <Continuous>  dextrose 50% Injectable 25 Gram(s) IV Push once  dextrose 50% Injectable 12.5 Gram(s) IV Push once  dextrose 50% Injectable 25 Gram(s) IV Push once  dextrose Oral Gel 15 Gram(s) Oral once PRN  glucagon  Injectable 1 milliGRAM(s) IntraMuscular once  indocyanine green Injectable 1.5 milliGRAM(s) IV Push once  melatonin 3 milliGRAM(s) Oral at bedtime PRN  ondansetron Injectable 4 milliGRAM(s) IV Push every 8 hours PRN  pantoprazole    Tablet 40 milliGRAM(s) Oral two times a day  piperacillin/tazobactam IVPB.. 3.375 Gram(s) IV Intermittent every 8 hours  tamsulosin 0.4 milliGRAM(s) Oral at bedtime      O:  Vital Signs Last 24 Hrs  T(C): 37.3 (25 Apr 2025 05:57), Max: 37.3 (24 Apr 2025 22:21)  T(F): 99.2 (25 Apr 2025 05:57), Max: 99.2 (24 Apr 2025 22:21)  HR: 106 (25 Apr 2025 05:57) (102 - 120)  BP: 151/75 (25 Apr 2025 05:57) (151/75 - 159/67)  BP(mean): --  RR: 18 (25 Apr 2025 05:57) (17 - 20)  SpO2: 92% (25 Apr 2025 05:57) (92% - 95%)    Parameters below as of 25 Apr 2025 05:57  Patient On (Oxygen Delivery Method): room air        I&O SUMMARY:      PHYSICAL EXAM:  Gen: NAD, non-toxic  Lungs: Breathing comfortably on RA  Card: Regular rate  Abd: Softly distended, RUQ tender no rebound or guarding, small soft umbilical hernia, insulin pump in place  Ext: Moving all extremities    LABS:                        14.7   18.86 )-----------( 196      ( 25 Apr 2025 07:01 )             42.2     04-25    131[L]  |  101  |  11  ----------------------------<  256[H]  3.9   |  24  |  1.10    Ca    8.9      25 Apr 2025 07:01    TPro  7.3  /  Alb  3.1[L]  /  TBili  4.4[H]  /  DBili  x   /  AST  20  /  ALT  35  /  AlkPhos  77  04-25    PT/INR - ( 25 Apr 2025 07:01 )   PT: 13.0 sec;   INR: 1.11 ratio         PTT - ( 25 Apr 2025 07:01 )  PTT:28.8 sec          RADIOLOGY:  4/24/25 HIDA   Positive    4/24/25 CT A/P  IMPRESSION:  Cholelithiasis with pericholecystic stranding. Cholecystitis is a consideration. Correlate with the right upper quadrant ultrasound

## 2025-04-25 NOTE — CHART NOTE - NSCHARTNOTEFT_GEN_A_CORE
s/p lap Melanie  Dr. Lawrence    S: Pt seen and examined at bedside, has no complaints. Denies CP, SOB, NAVARRETE, calf tenderness. Pain controlled with medication. Tolerating CLD    O:  T(C): 36.7 (04-25-25 @ 18:30), Max: 37.2 (04-25-25 @ 16:28)  T(F): 98.1 (04-25-25 @ 18:30), Max: 99 (04-25-25 @ 16:28)  HR: 107 (04-25-25 @ 18:30) (107 - 117)  BP: 144/75 (04-25-25 @ 18:30) (140/71 - 148/84)  RR: 22 (04-25-25 @ 18:30) (18 - 26)  SpO2: 100% (04-25-25 @ 18:30) (92% - 100%)  Wt(kg): --                        14.7   18.86 )-----------( 196      ( 25 Apr 2025 07:01 )             42.2     04-25    131[L]  |  101  |  11  ----------------------------<  256[H]  3.9   |  24  |  1.10    Ca    8.9      25 Apr 2025 07:01    TPro  7.3  /  Alb  3.1[L]  /  TBili  4.4[H]  /  DBili  x   /  AST  20  /  ALT  35  /  AlkPhos  77  04-25      Gen: NAD, resting comfortably in bed  C/V: NSR  Pulm: Nonlabored breathing, no respiratory distress  Abd: abdomen is soft, nontender, appropriately distended, no rebound or guarding, incisions are clean dry and intact   Extrem: WWP, no calf edema, SCDs in place      A/P: 59yMale s/p lap melanie for acute cholecystitis  CLD  IVF/IV abx  Pain/nausea control; no narcotics  DVT ppx  48h admission dispo pending diet tolerance and labs

## 2025-04-25 NOTE — CONSULT NOTE ADULT - SUBJECTIVE AND OBJECTIVE BOX
Patient is a 59y old  Male who presents with a chief complaint of     Reason For Consult: dm1 uncontrolled    HPI:  59m in recovery x 30 years with DM on insulin pump, BPH, HLD,  p/w abd pain. Pain started last night after eating chik-argentina-a.  pain is RUQ, nonradiating, and was severe. pt reports nausea and anorexia.  Of note pt had EGD yesterday and was told there were ulcers. Pt has no cardiac history, has good exercise tolerance, and denies cp, palpitations, and sob. (24 Apr 2025 21:26)      PAST MEDICAL & SURGICAL HISTORY:  DM (diabetes mellitus), type 1      HLD (hyperlipidemia)      No significant past surgical history          FAMILY HISTORY:        Social History:    MEDICATIONS  (STANDING):  atorvastatin 10 milliGRAM(s) Oral at bedtime  dextrose 5% + sodium chloride 0.45%. 1000 milliLiter(s) (110 mL/Hr) IV Continuous <Continuous>  dextrose 5%. 1000 milliLiter(s) (100 mL/Hr) IV Continuous <Continuous>  dextrose 5%. 1000 milliLiter(s) (50 mL/Hr) IV Continuous <Continuous>  dextrose 50% Injectable 25 Gram(s) IV Push once  dextrose 50% Injectable 12.5 Gram(s) IV Push once  dextrose 50% Injectable 25 Gram(s) IV Push once  glucagon  Injectable 1 milliGRAM(s) IntraMuscular once  indocyanine green Injectable 1.5 milliGRAM(s) IV Push once  pantoprazole    Tablet 40 milliGRAM(s) Oral two times a day  piperacillin/tazobactam IVPB.. 3.375 Gram(s) IV Intermittent every 8 hours  tamsulosin 0.4 milliGRAM(s) Oral at bedtime    MEDICATIONS  (PRN):  acetaminophen     Tablet .. 650 milliGRAM(s) Oral every 6 hours PRN Temp greater or equal to 38C (100.4F), Mild Pain (1 - 3)  acetaminophen   IVPB .. 1000 milliGRAM(s) IV Intermittent every 8 hours PRN Moderate Pain (4 - 6)  aluminum hydroxide/magnesium hydroxide/simethicone Suspension 30 milliLiter(s) Oral every 4 hours PRN Dyspepsia  dextrose Oral Gel 15 Gram(s) Oral once PRN Blood Glucose LESS THAN 70 milliGRAM(s)/deciliter  melatonin 3 milliGRAM(s) Oral at bedtime PRN Insomnia  ondansetron Injectable 4 milliGRAM(s) IV Push every 8 hours PRN Nausea and/or Vomiting        T(C): 37.3 (04-25-25 @ 05:57), Max: 37.3 (04-24-25 @ 22:21)  HR: 106 (04-25-25 @ 05:57) (98 - 120)  BP: 151/75 (04-25-25 @ 05:57) (151/75 - 160/78)  RR: 18 (04-25-25 @ 05:57) (16 - 20)  SpO2: 92% (04-25-25 @ 05:57) (92% - 98%)  Wt(kg): --    PHYSICAL EXAM:  GENERAL: NAD, well-groomed, well-developed  HEAD:  Atraumatic, Normocephalic  NECK: Supple, No JVD, Normal thyroid  CHEST/LUNG: Clear to percussion bilaterally; No rales, rhonchi, wheezing, or rubs  HEART: Regular rate and rhythm; No murmurs, rubs, or gallops  ABDOMEN: Soft, Nontender, Nondistended; Bowel sounds present  EXTREMITIES:  2+ Peripheral Pulses, No clubbing, cyanosis, or edema  SKIN: No rashes or lesions    CAPILLARY BLOOD GLUCOSE                                15.3   18.43 )-----------( 228      ( 24 Apr 2025 10:40 )             42.9       CMP:  04-24 @ 10:40  SGPT 24  Albumin 3.7   Alk Phos 82   Anion Gap 14   SGOT 12   Total Bili 3.4   BUN 12   Calcium Total 9.9   CO2 24   Chloride 97   Creatinine 1.10   eGFR if AA --   eGFR if non AA --   Glucose 278   Potassium 4.2   Protein 8.2   Sodium 135      Thyroid Function Tests:      Diabetes Tests:       Radiology:

## 2025-04-25 NOTE — CONSULT NOTE ADULT - SUBJECTIVE AND OBJECTIVE BOX
Patient is a 59y old  Male who presents with a chief complaint of   Type: DX year known complications Endocrine Last seen Rx home Hx DKA/HHS, Glucometer checks, needs, weight, diet, exercise  diabetes education provided  Hx ASCVD, CKD, HF    HPI:  59m in recovery x 30 years with DM on insulin pump, BPH, HLD,  p/w abd pain. Pain started last night after eating chik-argentina-a.  pain is RUQ, nonradiating, and was severe. pt reports nausea and anorexia.  Of note pt had EGD yesterday and was told there were ulcers. Pt has no cardiac history, has good exercise tolerance, and denies cp, palpitations, and sob. (24 Apr 2025 21:26)      PAST MEDICAL & SURGICAL HISTORY:  DM (diabetes mellitus), type 1      HLD (hyperlipidemia)      No significant past surgical history          Allergies    No Known Allergies    Intolerances    opioid-like analgesics (Other)      MEDICATIONS  (STANDING):  atorvastatin 10 milliGRAM(s) Oral at bedtime  dextrose 5% + sodium chloride 0.45%. 1000 milliLiter(s) (110 mL/Hr) IV Continuous <Continuous>  dextrose 5%. 1000 milliLiter(s) (100 mL/Hr) IV Continuous <Continuous>  dextrose 5%. 1000 milliLiter(s) (50 mL/Hr) IV Continuous <Continuous>  dextrose 50% Injectable 25 Gram(s) IV Push once  dextrose 50% Injectable 12.5 Gram(s) IV Push once  dextrose 50% Injectable 25 Gram(s) IV Push once  glucagon  Injectable 1 milliGRAM(s) IntraMuscular once  indocyanine green Injectable 1.5 milliGRAM(s) IV Push once  pantoprazole    Tablet 40 milliGRAM(s) Oral two times a day  piperacillin/tazobactam IVPB.. 3.375 Gram(s) IV Intermittent every 8 hours  tamsulosin 0.4 milliGRAM(s) Oral at bedtime       Patient is a 59y old  Male who presents with a chief complaint of   Type:1.5 DX 2006. Novolog omni pod pump since 2018- dexcom sensor- ISF 1:25 mg/dL,  CR 1:6-8 gm; basal rate .5 units/hr. Both sensor/pod are in operative area. Discussed with patient if removed and applied post op- patient states may not be able to reattach if sedated. Decided to keep the pump on until surgery time- remove preop- administer Lantus 12 units 2 hours prior to removal of pump- case discussed with Perlman, Dr wright, dr page. diabetes education provided      HPI:  59m in recovery x 30 years with DM on insulin pump, BPH, HLD,  p/w abd pain. Pain started last night after eating chik-argentina-a.  pain is RUQ, nonradiating, and was severe. pt reports nausea and anorexia.  Of note pt had EGD yesterday and was told there were ulcers. Pt has no cardiac history, has good exercise tolerance, and denies cp, palpitations, and sob. (24 Apr 2025 21:26)      PAST MEDICAL & SURGICAL HISTORY:  DM (diabetes mellitus), type 1      HLD (hyperlipidemia)      No significant past surgical history          Allergies    No Known Allergies    Intolerances    opioid-like analgesics (Other)      MEDICATIONS  (STANDING):  atorvastatin 10 milliGRAM(s) Oral at bedtime  dextrose 5% + sodium chloride 0.45%. 1000 milliLiter(s) (110 mL/Hr) IV Continuous <Continuous>  dextrose 5%. 1000 milliLiter(s) (100 mL/Hr) IV Continuous <Continuous>  dextrose 5%. 1000 milliLiter(s) (50 mL/Hr) IV Continuous <Continuous>  dextrose 50% Injectable 25 Gram(s) IV Push once  dextrose 50% Injectable 12.5 Gram(s) IV Push once  dextrose 50% Injectable 25 Gram(s) IV Push once  glucagon  Injectable 1 milliGRAM(s) IntraMuscular once  indocyanine green Injectable 1.5 milliGRAM(s) IV Push once  pantoprazole    Tablet 40 milliGRAM(s) Oral two times a day  piperacillin/tazobactam IVPB.. 3.375 Gram(s) IV Intermittent every 8 hours  tamsulosin 0.4 milliGRAM(s) Oral at bedtime

## 2025-04-25 NOTE — CARE COORDINATION ASSESSMENT. - NSPASTMEDSURGHISTORY_GEN_ALL_CORE_FT
PAST MEDICAL & SURGICAL HISTORY:  HLD (hyperlipidemia)      DM (diabetes mellitus), type 1      No significant past surgical history

## 2025-04-25 NOTE — CARE COORDINATION ASSESSMENT. - ASSESSMENT CONCERNS TO BE ADDRESSED
Per EMR: 59 year old male in recovery x 30 years with DM on insulin pump, BPH, HLD. Admit with Acute Cholecystitis./discharge planning

## 2025-04-25 NOTE — PROVIDER CONTACT NOTE (OTHER) - RECOMMENDATIONS
protocol denotes 2 units of insulin as per the low dose sliding scale.  NO recommendations for Dr as Patient not usually on Lantus.

## 2025-04-26 DIAGNOSIS — E11.65 TYPE 2 DIABETES MELLITUS WITH HYPERGLYCEMIA: ICD-10-CM

## 2025-04-26 LAB
A1C WITH ESTIMATED AVERAGE GLUCOSE RESULT: 9 % — HIGH (ref 4–5.6)
ALBUMIN SERPL ELPH-MCNC: 2.7 G/DL — LOW (ref 3.3–5)
ALP SERPL-CCNC: 83 U/L — SIGNIFICANT CHANGE UP (ref 40–120)
ALT FLD-CCNC: 76 U/L — SIGNIFICANT CHANGE UP (ref 12–78)
ANION GAP SERPL CALC-SCNC: 6 MMOL/L — SIGNIFICANT CHANGE UP (ref 5–17)
AST SERPL-CCNC: 44 U/L — HIGH (ref 15–37)
BILIRUB DIRECT SERPL-MCNC: 0.7 MG/DL — HIGH (ref 0–0.3)
BILIRUB INDIRECT FLD-MCNC: 2.2 MG/DL — HIGH (ref 0.2–1)
BILIRUB SERPL-MCNC: 2.9 MG/DL — HIGH (ref 0.2–1.2)
BUN SERPL-MCNC: 23 MG/DL — SIGNIFICANT CHANGE UP (ref 7–23)
CALCIUM SERPL-MCNC: 8.8 MG/DL — SIGNIFICANT CHANGE UP (ref 8.5–10.1)
CHLORIDE SERPL-SCNC: 101 MMOL/L — SIGNIFICANT CHANGE UP (ref 96–108)
CO2 SERPL-SCNC: 25 MMOL/L — SIGNIFICANT CHANGE UP (ref 22–31)
CREAT SERPL-MCNC: 1.4 MG/DL — HIGH (ref 0.5–1.3)
EGFR: 57 ML/MIN/1.73M2 — LOW
EGFR: 57 ML/MIN/1.73M2 — LOW
ESTIMATED AVERAGE GLUCOSE: 212 MG/DL — HIGH (ref 68–114)
GLUCOSE SERPL-MCNC: 383 MG/DL — HIGH (ref 70–99)
HCT VFR BLD CALC: 37.2 % — LOW (ref 39–50)
HGB BLD-MCNC: 13 G/DL — SIGNIFICANT CHANGE UP (ref 13–17)
MAGNESIUM SERPL-MCNC: 2.3 MG/DL — SIGNIFICANT CHANGE UP (ref 1.6–2.6)
MCHC RBC-ENTMCNC: 29.2 PG — SIGNIFICANT CHANGE UP (ref 27–34)
MCHC RBC-ENTMCNC: 34.9 G/DL — SIGNIFICANT CHANGE UP (ref 32–36)
MCV RBC AUTO: 83.6 FL — SIGNIFICANT CHANGE UP (ref 80–100)
NRBC BLD AUTO-RTO: 0 /100 WBCS — SIGNIFICANT CHANGE UP (ref 0–0)
PHOSPHATE SERPL-MCNC: 2.4 MG/DL — LOW (ref 2.5–4.5)
PLATELET # BLD AUTO: 177 K/UL — SIGNIFICANT CHANGE UP (ref 150–400)
POTASSIUM SERPL-MCNC: 4.2 MMOL/L — SIGNIFICANT CHANGE UP (ref 3.5–5.3)
POTASSIUM SERPL-SCNC: 4.2 MMOL/L — SIGNIFICANT CHANGE UP (ref 3.5–5.3)
PROT SERPL-MCNC: 7 G/DL — SIGNIFICANT CHANGE UP (ref 6–8.3)
RBC # BLD: 4.45 M/UL — SIGNIFICANT CHANGE UP (ref 4.2–5.8)
RBC # FLD: 12.7 % — SIGNIFICANT CHANGE UP (ref 10.3–14.5)
SODIUM SERPL-SCNC: 132 MMOL/L — LOW (ref 135–145)
WBC # BLD: 15.21 K/UL — HIGH (ref 3.8–10.5)
WBC # FLD AUTO: 15.21 K/UL — HIGH (ref 3.8–10.5)

## 2025-04-26 RX ORDER — SODIUM CHLORIDE 9 G/1000ML
1000 INJECTION, SOLUTION INTRAVENOUS
Refills: 0 | Status: DISCONTINUED | OUTPATIENT
Start: 2025-04-26 | End: 2025-04-28

## 2025-04-26 RX ORDER — MAGNESIUM, ALUMINUM HYDROXIDE 200-200 MG
30 TABLET,CHEWABLE ORAL EVERY 4 HOURS
Refills: 0 | Status: DISCONTINUED | OUTPATIENT
Start: 2025-04-26 | End: 2025-04-28

## 2025-04-26 RX ORDER — CELECOXIB 50 MG/1
100 CAPSULE ORAL
Refills: 0 | Status: DISCONTINUED | OUTPATIENT
Start: 2025-04-26 | End: 2025-04-27

## 2025-04-26 RX ORDER — MELATONIN 5 MG
3 TABLET ORAL AT BEDTIME
Refills: 0 | Status: DISCONTINUED | OUTPATIENT
Start: 2025-04-26 | End: 2025-04-28

## 2025-04-26 RX ADMIN — TAMSULOSIN HYDROCHLORIDE 0.4 MILLIGRAM(S): 0.4 CAPSULE ORAL at 21:29

## 2025-04-26 RX ADMIN — ATORVASTATIN CALCIUM 10 MILLIGRAM(S): 80 TABLET, FILM COATED ORAL at 21:30

## 2025-04-26 RX ADMIN — KETOROLAC TROMETHAMINE 15 MILLIGRAM(S): 30 INJECTION, SOLUTION INTRAMUSCULAR; INTRAVENOUS at 08:31

## 2025-04-26 RX ADMIN — CELECOXIB 100 MILLIGRAM(S): 50 CAPSULE ORAL at 17:01

## 2025-04-26 RX ADMIN — Medication 25 GRAM(S): at 13:41

## 2025-04-26 RX ADMIN — Medication 25 GRAM(S): at 21:29

## 2025-04-26 RX ADMIN — Medication 40 MILLIGRAM(S): at 05:30

## 2025-04-26 RX ADMIN — KETOROLAC TROMETHAMINE 15 MILLIGRAM(S): 30 INJECTION, SOLUTION INTRAMUSCULAR; INTRAVENOUS at 08:01

## 2025-04-26 RX ADMIN — Medication 400 MILLIGRAM(S): at 11:27

## 2025-04-26 RX ADMIN — Medication 25 GRAM(S): at 05:30

## 2025-04-26 RX ADMIN — Medication 40 MILLIGRAM(S): at 17:22

## 2025-04-26 RX ADMIN — Medication 400 MILLIGRAM(S): at 04:13

## 2025-04-26 RX ADMIN — INSULIN ASPART 1 EACH: 100 INJECTION, SOLUTION INTRAVENOUS; SUBCUTANEOUS at 13:15

## 2025-04-26 RX ADMIN — INSULIN LISPRO 3: 100 INJECTION, SOLUTION INTRAVENOUS; SUBCUTANEOUS at 08:19

## 2025-04-26 RX ADMIN — Medication 400 MILLIGRAM(S): at 17:01

## 2025-04-26 NOTE — PROGRESS NOTE ADULT - SUBJECTIVE AND OBJECTIVE BOX
Patient is a 59y old  Male who presents with a chief complaint of       INTERVAL HPI/OVERNIGHT EVENTS:   no complaints  pt seen and examined         Vital Signs Last 24 Hrs  T(C): 36.6 (26 Apr 2025 12:02), Max: 37.2 (25 Apr 2025 19:42)  T(F): 97.8 (26 Apr 2025 12:02), Max: 98.9 (25 Apr 2025 19:42)  HR: 90 (26 Apr 2025 12:02) (90 - 119)  BP: 128/73 (26 Apr 2025 12:02) (128/73 - 146/77)  BP(mean): --  RR: 18 (26 Apr 2025 12:02) (18 - 22)  SpO2: 92% (26 Apr 2025 12:02) (91% - 100%)    Parameters below as of 26 Apr 2025 12:02  Patient On (Oxygen Delivery Method): room air        aluminum hydroxide/magnesium hydroxide/simethicone Suspension 30 milliLiter(s) Oral every 4 hours PRN  atorvastatin 10 milliGRAM(s) Oral at bedtime  celecoxib 100 milliGRAM(s) Oral two times a day  dextrose 5%. 1000 milliLiter(s) IV Continuous <Continuous>  dextrose 5%. 1000 milliLiter(s) IV Continuous <Continuous>  glucagon  Injectable 1 milliGRAM(s) IntraMuscular once  insulin aspart (NovoLOG) Pump 1 Each SubCutaneous Continuous Pump  insulin lispro (ADMELOG) corrective regimen sliding scale   SubCutaneous three times a day before meals  insulin lispro (ADMELOG) corrective regimen sliding scale   SubCutaneous at bedtime  melatonin 3 milliGRAM(s) Oral at bedtime PRN  ondansetron Injectable 4 milliGRAM(s) IV Push every 8 hours PRN  pantoprazole    Tablet 40 milliGRAM(s) Oral two times a day  piperacillin/tazobactam IVPB.. 3.375 Gram(s) IV Intermittent every 8 hours  tamsulosin 0.4 milliGRAM(s) Oral at bedtime      PHYSICAL EXAM:  GENERAL: NAD   EYES: conjunctiva and sclera clear  ENMT: Moist mucous membranes  NECK: Supple, No JVD, Normal thyroid  CHEST/LUNG: non labored, cta b/l  HEART: Regular rate and rhythm; No murmurs, rubs, or gallops  ABDOMEN: Soft, Nontender, Nondistended; Bowel sounds present  EXTREMITIES:  No clubbing, no cyanosis, no edema  LYMPH: No lymphadenopathy noted  SKIN: No rashes or lesions  NEURO: no new focal deficits    Consultant(s) Notes Reviewed:  [x ] YES  [ ] NO  Care Discussed with Consultants/Other Providers [ x] YES  [ ] NO    LABS:                        13.0   15.21 )-----------( 177      ( 26 Apr 2025 08:27 )             37.2     04-26    132[L]  |  101  |  23  ----------------------------<  383[H]  4.2   |  25  |  1.40[H]    Ca    8.8      26 Apr 2025 08:27  Phos  2.4     04-26  Mg     2.3     04-26    TPro  7.0  /  Alb  2.7[L]  /  TBili  2.9[H]  /  DBili  0.7[H]  /  AST  44[H]  /  ALT  76  /  AlkPhos  83  04-26    PT/INR - ( 25 Apr 2025 07:01 )   PT: 13.0 sec;   INR: 1.11 ratio         PTT - ( 25 Apr 2025 07:01 )  PTT:28.8 sec  Urinalysis Basic - ( 26 Apr 2025 08:27 )    Color: x / Appearance: x / SG: x / pH: x  Gluc: 383 mg/dL / Ketone: x  / Bili: x / Urobili: x   Blood: x / Protein: x / Nitrite: x   Leuk Esterase: x / RBC: x / WBC x   Sq Epi: x / Non Sq Epi: x / Bacteria: x      CAPILLARY BLOOD GLUCOSE      POCT Blood Glucose.: 272 mg/dL (26 Apr 2025 08:09)  POCT Blood Glucose.: 392 mg/dL (25 Apr 2025 21:16)  POCT Blood Glucose.: 318 mg/dL (25 Apr 2025 18:46)        Urinalysis Basic - ( 26 Apr 2025 08:27 )    Color: x / Appearance: x / SG: x / pH: x  Gluc: 383 mg/dL / Ketone: x  / Bili: x / Urobili: x   Blood: x / Protein: x / Nitrite: x   Leuk Esterase: x / RBC: x / WBC x   Sq Epi: x / Non Sq Epi: x / Bacteria: x          RADIOLOGY & ADDITIONAL TESTS:    Imaging Personally Reviewed  Reviewed consultants input

## 2025-04-26 NOTE — PROGRESS NOTE ADULT - SUBJECTIVE AND OBJECTIVE BOX
POD#1 s/p lap cholecystectomy    S: Patient seen and examined at bedside.  No overnight events.  Patient reports no new complaints at this time.  Admits to flatus.  Voiding, ambulating and tolerating diet. Patient denies any fever, chills, chest pain, shortness of breath, nausea, vomiting, or urinary complaints.    MEDICATIONS:  MEDICATIONS  (STANDING):  acetaminophen   IVPB .. 1000 milliGRAM(s) IV Intermittent every 6 hours  atorvastatin 10 milliGRAM(s) Oral at bedtime  glucagon  Injectable 1 milliGRAM(s) IntraMuscular once  insulin aspart (NovoLOG) Pump 1 Each SubCutaneous Continuous Pump  insulin lispro (ADMELOG) corrective regimen sliding scale   SubCutaneous three times a day before meals  insulin lispro (ADMELOG) corrective regimen sliding scale   SubCutaneous at bedtime  pantoprazole    Tablet 40 milliGRAM(s) Oral two times a day  piperacillin/tazobactam IVPB.. 3.375 Gram(s) IV Intermittent every 8 hours  tamsulosin 0.4 milliGRAM(s) Oral at bedtime    MEDICATIONS  (PRN):  ketorolac   Injectable 15 milliGRAM(s) IV Push every 6 hours PRN Moderate Pain (4 - 6)  ondansetron Injectable 4 milliGRAM(s) IV Push every 8 hours PRN Nausea and/or Vomiting      O:  VITAL SIGNS:  Vital Signs Last 24 Hrs  T(C): 36.6 (26 Apr 2025 04:44), Max: 38.1 (25 Apr 2025 13:52)  T(F): 97.9 (26 Apr 2025 04:44), Max: 100.6 (25 Apr 2025 13:52)  HR: 95 (26 Apr 2025 04:44) (95 - 129)  BP: 146/77 (26 Apr 2025 04:44) (135/74 - 153/84)  RR: 18 (26 Apr 2025 04:44) (18 - 26)  SpO2: 91% (26 Apr 2025 04:44) (90% - 100%)    Parameters below as of 26 Apr 2025 04:44  Patient On (Oxygen Delivery Method): room air        PHYSICAL EXAM:  GENERAL: No acute distress, lying comfortably in bed  HEAD:  Atraumatic, Normocephalic  CHEST/LUNG: Non labored respirations, no accessory muscle use  HEART: Regular rate and rhythm  ABDOMEN: Soft, softly distended, mild periincisional tenderness, GABI RLQ with SS drainage in bulb.   EXT: calves non-tender b/l, no edema  NEUROLOGY: A&O x 3, no focal deficits    INTAKE & OUTPUT:  I&O's Summary    25 Apr 2025 07:01  -  26 Apr 2025 07:00  --------------------------------------------------------  IN: 150 mL / OUT: 70 mL / NET: 80 mL      I&O's Detail    25 Apr 2025 07:01  -  26 Apr 2025 07:00  --------------------------------------------------------  IN:    Lactated Ringers: 150 mL  Total IN: 150 mL    OUT:    Drain (mL): 70 mL    Oral Fluid: 0 mL    Voided (mL): 0 mL  Total OUT: 70 mL    Total NET: 80 mL          LABS:                        13.0   15.21 )-----------( 177      ( 26 Apr 2025 08:27 )             37.2     04-25    131[L]  |  101  |  11  ----------------------------<  256[H]  3.9   |  24  |  1.10    Ca    8.9      25 Apr 2025 07:01    TPro  7.3  /  Alb  3.1[L]  /  TBili  4.4[H]  /  DBili  x   /  AST  20  /  ALT  35  /  AlkPhos  77  04-25    PT/INR - ( 25 Apr 2025 07:01 )   PT: 13.0 sec;   INR: 1.11 ratio         PTT - ( 25 Apr 2025 07:01 )  PTT:28.8 sec        A: 58y/o Male PMHx Gilbert syndrome, diabetes, HLD who presents with RUQ pain and nausea found to have positive HIDA scan consistent with acute cholecystitis, now POD#1 s/p lap cholecystectomy. VSSAF, exam consistent with  post-op state.     P:  - VSSAF, exam consistent with post-op state  - Labs with WC downtrending, CMP pending  - Continue diet x 48 hours after surgery 2/2 gangrenous gallbladder  - Adv diet to regular low fat  - Monitor output of GABI drain   - DVT prophylaxis with  - Encourage OOBA, IS  - Pain control, supportive care    Case discussed with Dr. Casper (covering for Dr. Lawrence) POD#1 s/p lap cholecystectomy    S: Patient seen and examined at bedside.  No overnight events.  Patient reports no new complaints at this time.  Admits to flatus.  Voiding, ambulating and tolerating diet. Patient denies any fever, chills, chest pain, shortness of breath, nausea, vomiting, or urinary complaints.    MEDICATIONS:  MEDICATIONS  (STANDING):  acetaminophen   IVPB .. 1000 milliGRAM(s) IV Intermittent every 6 hours  atorvastatin 10 milliGRAM(s) Oral at bedtime  glucagon  Injectable 1 milliGRAM(s) IntraMuscular once  insulin aspart (NovoLOG) Pump 1 Each SubCutaneous Continuous Pump  insulin lispro (ADMELOG) corrective regimen sliding scale   SubCutaneous three times a day before meals  insulin lispro (ADMELOG) corrective regimen sliding scale   SubCutaneous at bedtime  pantoprazole    Tablet 40 milliGRAM(s) Oral two times a day  piperacillin/tazobactam IVPB.. 3.375 Gram(s) IV Intermittent every 8 hours  tamsulosin 0.4 milliGRAM(s) Oral at bedtime    MEDICATIONS  (PRN):  ketorolac   Injectable 15 milliGRAM(s) IV Push every 6 hours PRN Moderate Pain (4 - 6)  ondansetron Injectable 4 milliGRAM(s) IV Push every 8 hours PRN Nausea and/or Vomiting      O:  VITAL SIGNS:  Vital Signs Last 24 Hrs  T(C): 36.6 (26 Apr 2025 04:44), Max: 38.1 (25 Apr 2025 13:52)  T(F): 97.9 (26 Apr 2025 04:44), Max: 100.6 (25 Apr 2025 13:52)  HR: 95 (26 Apr 2025 04:44) (95 - 129)  BP: 146/77 (26 Apr 2025 04:44) (135/74 - 153/84)  RR: 18 (26 Apr 2025 04:44) (18 - 26)  SpO2: 91% (26 Apr 2025 04:44) (90% - 100%)    Parameters below as of 26 Apr 2025 04:44  Patient On (Oxygen Delivery Method): room air        PHYSICAL EXAM:  GENERAL: No acute distress, lying comfortably in bed  HEAD:  Atraumatic, Normocephalic  CHEST/LUNG: Non labored respirations, no accessory muscle use  HEART: Regular rate and rhythm  ABDOMEN: Soft, softly distended, mild periincisional tenderness, GABI RLQ with SS drainage in bulb.   EXT: calves non-tender b/l, no edema  NEUROLOGY: A&O x 3, no focal deficits    INTAKE & OUTPUT:  I&O's Summary    25 Apr 2025 07:01  -  26 Apr 2025 07:00  --------------------------------------------------------  IN: 150 mL / OUT: 70 mL / NET: 80 mL      I&O's Detail    25 Apr 2025 07:01  -  26 Apr 2025 07:00  --------------------------------------------------------  IN:    Lactated Ringers: 150 mL  Total IN: 150 mL    OUT:    Drain (mL): 70 mL    Oral Fluid: 0 mL    Voided (mL): 0 mL  Total OUT: 70 mL    Total NET: 80 mL          LABS:                        13.0   15.21 )-----------( 177      ( 26 Apr 2025 08:27 )             37.2     04-25    131[L]  |  101  |  11  ----------------------------<  256[H]  3.9   |  24  |  1.10    Ca    8.9      25 Apr 2025 07:01    TPro  7.3  /  Alb  3.1[L]  /  TBili  4.4[H]  /  DBili  x   /  AST  20  /  ALT  35  /  AlkPhos  77  04-25    PT/INR - ( 25 Apr 2025 07:01 )   PT: 13.0 sec;   INR: 1.11 ratio         PTT - ( 25 Apr 2025 07:01 )  PTT:28.8 sec        A: 58y/o Male PMHx Gilbert syndrome, diabetes, HLD who presents with RUQ pain and nausea found to have positive HIDA scan consistent with acute cholecystitis, now POD#1 s/p lap cholecystectomy. VSSAF, exam consistent with  post-op state.     P:  - VSSAF, exam consistent with post-op state  - Labs with WC downtrending, CMP pending  - Continue abx x 48 hours after surgery 2/2 gangrenous gallbladder. Will plan for d/c on vantin/flagyl x 7 days.  - Adv diet to regular low fat  - Monitor output of GABI drain   - DVT prophylaxis with  - Encourage OOBA, IS  - Pain control, supportive care    Case discussed with Dr. Casper (covering for Dr. Lawrence)

## 2025-04-26 NOTE — PROGRESS NOTE ADULT - ASSESSMENT
59m in recovery x 30 years with DM on insulin pump, BPH, HLD,  p/w acute cholecystitis  positive HIDA  US and CT consistent with acute cholecystitis  surgery following  s/p lap melanie    pain  iv tylenol  would avoid toradol given recent ulcers  short course of celebrex  pt wants to avoid narcotics    DM  diabetic diet  fingersticks  continue insulin pump  endocrine consulted    BPH  continue flomax    HLD  continue lipitor

## 2025-04-26 NOTE — PROGRESS NOTE ADULT - SUBJECTIVE AND OBJECTIVE BOX
CAPILLARY BLOOD GLUCOSE      POCT Blood Glucose.: 272 mg/dL (26 Apr 2025 08:09)  POCT Blood Glucose.: 392 mg/dL (25 Apr 2025 21:16)      Vital Signs Last 24 Hrs  T(C): 36.9 (26 Apr 2025 19:36), Max: 36.9 (26 Apr 2025 19:36)  T(F): 98.4 (26 Apr 2025 19:36), Max: 98.4 (26 Apr 2025 19:36)  HR: 95 (26 Apr 2025 19:36) (90 - 95)  BP: 135/68 (26 Apr 2025 19:36) (128/73 - 146/77)  BP(mean): --  RR: 19 (26 Apr 2025 19:36) (18 - 19)  SpO2: 91% (26 Apr 2025 19:36) (91% - 92%)    Parameters below as of 26 Apr 2025 19:36  Patient On (Oxygen Delivery Method): room air        General: WN/WD NAD  Respiratory: CTA B/L  CV: RRR, S1S2, no murmurs, rubs or gallops  Abdominal: Soft, NT, ND +BS, Last BM  Extremities: No edema, + peripheral pulses     04-26    132[L]  |  101  |  23  ----------------------------<  383[H]  4.2   |  25  |  1.40[H]    Ca    8.8      26 Apr 2025 08:27  Phos  2.4     04-26  Mg     2.3     04-26    TPro  7.0  /  Alb  2.7[L]  /  TBili  2.9[H]  /  DBili  0.7[H]  /  AST  44[H]  /  ALT  76  /  AlkPhos  83  04-26      atorvastatin 10 milliGRAM(s) Oral at bedtime  glucagon  Injectable 1 milliGRAM(s) IntraMuscular once  insulin aspart (NovoLOG) Pump 1 Each SubCutaneous Continuous Pump  insulin lispro (ADMELOG) corrective regimen sliding scale   SubCutaneous three times a day before meals  insulin lispro (ADMELOG) corrective regimen sliding scale   SubCutaneous at bedtime

## 2025-04-27 LAB
ALBUMIN SERPL ELPH-MCNC: 2.6 G/DL — LOW (ref 3.3–5)
ALP SERPL-CCNC: 94 U/L — SIGNIFICANT CHANGE UP (ref 40–120)
ALT FLD-CCNC: 64 U/L — SIGNIFICANT CHANGE UP (ref 12–78)
ANION GAP SERPL CALC-SCNC: 6 MMOL/L — SIGNIFICANT CHANGE UP (ref 5–17)
AST SERPL-CCNC: 33 U/L — SIGNIFICANT CHANGE UP (ref 15–37)
BILIRUB DIRECT SERPL-MCNC: 0.7 MG/DL — HIGH (ref 0–0.3)
BILIRUB INDIRECT FLD-MCNC: 1.6 MG/DL — HIGH (ref 0.2–1)
BILIRUB SERPL-MCNC: 2.3 MG/DL — HIGH (ref 0.2–1.2)
BUN SERPL-MCNC: 17 MG/DL — SIGNIFICANT CHANGE UP (ref 7–23)
CALCIUM SERPL-MCNC: 8.9 MG/DL — SIGNIFICANT CHANGE UP (ref 8.5–10.1)
CHLORIDE SERPL-SCNC: 104 MMOL/L — SIGNIFICANT CHANGE UP (ref 96–108)
CO2 SERPL-SCNC: 26 MMOL/L — SIGNIFICANT CHANGE UP (ref 22–31)
CREAT SERPL-MCNC: 1.1 MG/DL — SIGNIFICANT CHANGE UP (ref 0.5–1.3)
EGFR: 76 ML/MIN/1.73M2 — SIGNIFICANT CHANGE UP
EGFR: 76 ML/MIN/1.73M2 — SIGNIFICANT CHANGE UP
GLUCOSE SERPL-MCNC: 299 MG/DL — HIGH (ref 70–99)
HCT VFR BLD CALC: 36.8 % — LOW (ref 39–50)
HGB BLD-MCNC: 12.6 G/DL — LOW (ref 13–17)
MCHC RBC-ENTMCNC: 28.5 PG — SIGNIFICANT CHANGE UP (ref 27–34)
MCHC RBC-ENTMCNC: 34.2 G/DL — SIGNIFICANT CHANGE UP (ref 32–36)
MCV RBC AUTO: 83.3 FL — SIGNIFICANT CHANGE UP (ref 80–100)
NRBC BLD AUTO-RTO: 0 /100 WBCS — SIGNIFICANT CHANGE UP (ref 0–0)
PLATELET # BLD AUTO: 215 K/UL — SIGNIFICANT CHANGE UP (ref 150–400)
POTASSIUM SERPL-MCNC: 3.8 MMOL/L — SIGNIFICANT CHANGE UP (ref 3.5–5.3)
POTASSIUM SERPL-SCNC: 3.8 MMOL/L — SIGNIFICANT CHANGE UP (ref 3.5–5.3)
PROT SERPL-MCNC: 7 G/DL — SIGNIFICANT CHANGE UP (ref 6–8.3)
RBC # BLD: 4.42 M/UL — SIGNIFICANT CHANGE UP (ref 4.2–5.8)
RBC # FLD: 12.5 % — SIGNIFICANT CHANGE UP (ref 10.3–14.5)
SODIUM SERPL-SCNC: 136 MMOL/L — SIGNIFICANT CHANGE UP (ref 135–145)
WBC # BLD: 9.66 K/UL — SIGNIFICANT CHANGE UP (ref 3.8–10.5)
WBC # FLD AUTO: 9.66 K/UL — SIGNIFICANT CHANGE UP (ref 3.8–10.5)

## 2025-04-27 RX ORDER — ACETAMINOPHEN 500 MG/5ML
1000 LIQUID (ML) ORAL EVERY 6 HOURS
Refills: 0 | Status: DISCONTINUED | OUTPATIENT
Start: 2025-04-27 | End: 2025-04-28

## 2025-04-27 RX ORDER — ACETAMINOPHEN 500 MG/5ML
1000 LIQUID (ML) ORAL ONCE
Refills: 0 | Status: COMPLETED | OUTPATIENT
Start: 2025-04-27 | End: 2025-04-27

## 2025-04-27 RX ORDER — KETOROLAC TROMETHAMINE 30 MG/ML
15 INJECTION, SOLUTION INTRAMUSCULAR; INTRAVENOUS ONCE
Refills: 0 | Status: DISCONTINUED | OUTPATIENT
Start: 2025-04-27 | End: 2025-04-27

## 2025-04-27 RX ADMIN — Medication 400 MILLIGRAM(S): at 00:45

## 2025-04-27 RX ADMIN — Medication 25 GRAM(S): at 21:17

## 2025-04-27 RX ADMIN — CELECOXIB 100 MILLIGRAM(S): 50 CAPSULE ORAL at 06:33

## 2025-04-27 RX ADMIN — Medication 1000 MILLIGRAM(S): at 01:30

## 2025-04-27 RX ADMIN — ATORVASTATIN CALCIUM 10 MILLIGRAM(S): 80 TABLET, FILM COATED ORAL at 21:17

## 2025-04-27 RX ADMIN — TAMSULOSIN HYDROCHLORIDE 0.4 MILLIGRAM(S): 0.4 CAPSULE ORAL at 21:18

## 2025-04-27 RX ADMIN — Medication 1000 MILLIGRAM(S): at 11:45

## 2025-04-27 RX ADMIN — Medication 1000 MILLIGRAM(S): at 17:02

## 2025-04-27 RX ADMIN — Medication 40 MILLIGRAM(S): at 05:29

## 2025-04-27 RX ADMIN — Medication 25 GRAM(S): at 05:29

## 2025-04-27 RX ADMIN — Medication 40 MILLIGRAM(S): at 17:02

## 2025-04-27 RX ADMIN — Medication 1000 MILLIGRAM(S): at 11:02

## 2025-04-27 RX ADMIN — Medication 3 MILLIGRAM(S): at 21:18

## 2025-04-27 RX ADMIN — CELECOXIB 100 MILLIGRAM(S): 50 CAPSULE ORAL at 05:29

## 2025-04-27 RX ADMIN — Medication 25 GRAM(S): at 13:29

## 2025-04-27 RX ADMIN — CELECOXIB 100 MILLIGRAM(S): 50 CAPSULE ORAL at 17:02

## 2025-04-27 NOTE — PROGRESS NOTE ADULT - ASSESSMENT
59m in recovery x 30 years with DM on insulin pump, BPH, HLD,  p/w acute cholecystitis  positive HIDA  US and CT consistent with acute cholecystitis  surgery following  s/p lap melanie    pain  iv tylenol  would avoid toradol given recent ulcers  short course of celebrex  pt wants to avoid narcotics    DM  diabetic diet  fingersticks  continue insulin pump  endocrine consulted    BPH  continue flomax    HLD  continue lipitor    plan on dc home in am

## 2025-04-27 NOTE — PROGRESS NOTE ADULT - SUBJECTIVE AND OBJECTIVE BOX
Patient is a 59y old  Male who presents with a chief complaint of       INTERVAL HPI/OVERNIGHT EVENTS:   no complaints  pt seen and examined         Vital Signs Last 24 Hrs  T(C): 36.7 (27 Apr 2025 19:45), Max: 37.1 (27 Apr 2025 05:37)  T(F): 98.1 (27 Apr 2025 19:45), Max: 98.7 (27 Apr 2025 05:37)  HR: 101 (27 Apr 2025 19:45) (100 - 101)  BP: 152/74 (27 Apr 2025 19:45) (144/97 - 157/75)  BP(mean): --  RR: 19 (27 Apr 2025 19:45) (18 - 19)  SpO2: 91% (27 Apr 2025 19:45) (90% - 93%)    Parameters below as of 27 Apr 2025 19:45  Patient On (Oxygen Delivery Method): room air        acetaminophen     Tablet .. 1000 milliGRAM(s) Oral every 6 hours  aluminum hydroxide/magnesium hydroxide/simethicone Suspension 30 milliLiter(s) Oral every 4 hours PRN  atorvastatin 10 milliGRAM(s) Oral at bedtime  celecoxib 100 milliGRAM(s) Oral two times a day  dextrose 5%. 1000 milliLiter(s) IV Continuous <Continuous>  dextrose 5%. 1000 milliLiter(s) IV Continuous <Continuous>  glucagon  Injectable 1 milliGRAM(s) IntraMuscular once  insulin aspart (NovoLOG) Pump 1 Each SubCutaneous Continuous Pump  insulin lispro (ADMELOG) corrective regimen sliding scale   SubCutaneous three times a day before meals  insulin lispro (ADMELOG) corrective regimen sliding scale   SubCutaneous at bedtime  melatonin 3 milliGRAM(s) Oral at bedtime PRN  ondansetron Injectable 4 milliGRAM(s) IV Push every 8 hours PRN  pantoprazole    Tablet 40 milliGRAM(s) Oral two times a day  piperacillin/tazobactam IVPB.. 3.375 Gram(s) IV Intermittent every 8 hours  tamsulosin 0.4 milliGRAM(s) Oral at bedtime      PHYSICAL EXAM:  GENERAL: NAD   EYES: conjunctiva and sclera clear  ENMT: Moist mucous membranes  NECK: Supple, No JVD, Normal thyroid  CHEST/LUNG: non labored, cta b/l  HEART: Regular rate and rhythm; No murmurs, rubs, or gallops  ABDOMEN: Soft, Nontender, Nondistended; Bowel sounds present  EXTREMITIES:  No clubbing, no cyanosis, no edema  LYMPH: No lymphadenopathy noted  SKIN: No rashes or lesions  NEURO: no new focal deficits    Consultant(s) Notes Reviewed:  [x ] YES  [ ] NO  Care Discussed with Consultants/Other Providers [ x] YES  [ ] NO    LABS:                        12.6   9.66  )-----------( 215      ( 27 Apr 2025 09:10 )             36.8     04-27    136  |  104  |  17  ----------------------------<  299[H]  3.8   |  26  |  1.10    Ca    8.9      27 Apr 2025 09:10  Phos  2.4     04-26  Mg     2.3     04-26    TPro  7.0  /  Alb  2.6[L]  /  TBili  2.3[H]  /  DBili  0.7[H]  /  AST  33  /  ALT  64  /  AlkPhos  94  04-27      Urinalysis Basic - ( 27 Apr 2025 09:10 )    Color: x / Appearance: x / SG: x / pH: x  Gluc: 299 mg/dL / Ketone: x  / Bili: x / Urobili: x   Blood: x / Protein: x / Nitrite: x   Leuk Esterase: x / RBC: x / WBC x   Sq Epi: x / Non Sq Epi: x / Bacteria: x      CAPILLARY BLOOD GLUCOSE            Urinalysis Basic - ( 27 Apr 2025 09:10 )    Color: x / Appearance: x / SG: x / pH: x  Gluc: 299 mg/dL / Ketone: x  / Bili: x / Urobili: x   Blood: x / Protein: x / Nitrite: x   Leuk Esterase: x / RBC: x / WBC x   Sq Epi: x / Non Sq Epi: x / Bacteria: x          RADIOLOGY & ADDITIONAL TESTS:    Imaging Personally Reviewed  Reviewed consultants input

## 2025-04-27 NOTE — PROGRESS NOTE ADULT - SUBJECTIVE AND OBJECTIVE BOX
POD#2 s/p lap cholecystectomy    S: Patient seen and examined at bedside, patient reports feeling well.  No overnight events. Tolerated regular diet yesterday. Complaining of pain localized to the RUQ somewhat improved with current pain regimen. C/o productive cough following surgery. Patient with questions regarding BP and HR both of which were elevated this AM.  Admits to flatus.  Patient denies any fever, chills, chest pain, shortness of breath, nausea, vomiting, or urinary complaints.    MEDICATIONS:  MEDICATIONS  (STANDING):  acetaminophen     Tablet .. 1000 milliGRAM(s) Oral every 6 hours  atorvastatin 10 milliGRAM(s) Oral at bedtime  celecoxib 100 milliGRAM(s) Oral two times a day  dextrose 5%. 1000 milliLiter(s) (50 mL/Hr) IV Continuous <Continuous>  dextrose 5%. 1000 milliLiter(s) (100 mL/Hr) IV Continuous <Continuous>  glucagon  Injectable 1 milliGRAM(s) IntraMuscular once  insulin aspart (NovoLOG) Pump 1 Each SubCutaneous Continuous Pump  insulin lispro (ADMELOG) corrective regimen sliding scale   SubCutaneous three times a day before meals  insulin lispro (ADMELOG) corrective regimen sliding scale   SubCutaneous at bedtime  pantoprazole    Tablet 40 milliGRAM(s) Oral two times a day  piperacillin/tazobactam IVPB.. 3.375 Gram(s) IV Intermittent every 8 hours  tamsulosin 0.4 milliGRAM(s) Oral at bedtime      O:  VITAL SIGNS:  Vital Signs Last 24 Hrs  T(C): 37.1 (27 Apr 2025 05:37), Max: 37.1 (27 Apr 2025 05:37)  T(F): 98.7 (27 Apr 2025 05:37), Max: 98.7 (27 Apr 2025 05:37)  HR: 100 (27 Apr 2025 05:37) (90 - 100)  BP: 144/97 (27 Apr 2025 05:37) (128/73 - 144/97)  RR: 18 (27 Apr 2025 05:37) (18 - 19)  SpO2: 93% (27 Apr 2025 05:37) (91% - 93%)    Parameters below as of 27 Apr 2025 05:37  Patient On (Oxygen Delivery Method): room air      PHYSICAL EXAM:  GENERAL: No acute distress, lying comfortably in bed  HEAD:  Atraumatic, Normocephalic  CHEST/LUNG: Non labored respirations, no accessory muscle use  HEART: Regular rate and rhythm  ABDOMEN: Soft, softly distended, mild periincisional tenderness, GABI RLQ with serosanguinous (<sanguinous) drainage in bulb.   EXT: calves non-tender b/l, no edema  NEUROLOGY: A&O x 3, no focal deficits    INTAKE & OUTPUT:  I&O's Summary    25 Apr 2025 07:01  -  26 Apr 2025 07:00  --------------------------------------------------------  IN: 150 mL / OUT: 70 mL / NET: 80 mL      I&O's Detail    26 Apr 2025 07:01  -  27 Apr 2025 07:00  --------------------------------------------------------  IN:  Total IN: 0 mL    OUT:    Drain (mL): 120 mL  Total OUT: 120 mL    Total NET: -120 mL    LABS:                        12.6   9.66  )-----------( 215      ( 27 Apr 2025 09:10 )             36.8     04-26    132[L]  |  101  |  23  ----------------------------<  383[H]  4.2   |  25  |  1.40[H]    Ca    8.8      26 Apr 2025 08:27  Phos  2.4     04-26  Mg     2.3     04-26    TPro  7.0  /  Alb  2.7[L]  /  TBili  2.9[H]  /  DBili  0.7[H]  /  AST  44[H]  /  ALT  76  /  AlkPhos  83  04-26      A: 58y/o Male PMHx Gilbert syndrome, diabetes, HLD who presents with RUQ pain and nausea found to have positive HIDA scan consistent with acute cholecystitis, now POD#2 s/p lap cholecystectomy. VSSAF, exam consistent with  post-op state. Labs pending this AM.    P:  - VSSAF, exam consistent with post-op state  - AM chemistry panel pending  - Abx continued x 48 hours after surgery. Will plan for d/c on vantin/flagyl x 7 days.  - Dispo to home pending lab results, will follow up Tbili   - Monitor output of GABI drain, if d/c to home today will plan to d/c with drain  - DVT prophylaxis with SCDs. ASA to resume when d/c'd home  - Encourage OOBA, IS  - Pain control, supportive care    Case discussed with Dr. Casper (covering for Dr. Lawrence)

## 2025-04-28 ENCOUNTER — TRANSCRIPTION ENCOUNTER (OUTPATIENT)
Age: 60
End: 2025-04-28

## 2025-04-28 VITALS
RESPIRATION RATE: 18 BRPM | OXYGEN SATURATION: 91 % | DIASTOLIC BLOOD PRESSURE: 90 MMHG | SYSTOLIC BLOOD PRESSURE: 167 MMHG | TEMPERATURE: 98 F | HEART RATE: 89 BPM

## 2025-04-28 PROCEDURE — 83690 ASSAY OF LIPASE: CPT

## 2025-04-28 PROCEDURE — 99285 EMERGENCY DEPT VISIT HI MDM: CPT

## 2025-04-28 PROCEDURE — 86900 BLOOD TYPING SEROLOGIC ABO: CPT

## 2025-04-28 PROCEDURE — 96366 THER/PROPH/DIAG IV INF ADDON: CPT

## 2025-04-28 PROCEDURE — 83036 HEMOGLOBIN GLYCOSYLATED A1C: CPT

## 2025-04-28 PROCEDURE — 86850 RBC ANTIBODY SCREEN: CPT

## 2025-04-28 PROCEDURE — 82962 GLUCOSE BLOOD TEST: CPT

## 2025-04-28 PROCEDURE — 88304 TISSUE EXAM BY PATHOLOGIST: CPT

## 2025-04-28 PROCEDURE — 80048 BASIC METABOLIC PNL TOTAL CA: CPT

## 2025-04-28 PROCEDURE — 85027 COMPLETE CBC AUTOMATED: CPT

## 2025-04-28 PROCEDURE — 83735 ASSAY OF MAGNESIUM: CPT

## 2025-04-28 PROCEDURE — 86901 BLOOD TYPING SEROLOGIC RH(D): CPT

## 2025-04-28 PROCEDURE — 85730 THROMBOPLASTIN TIME PARTIAL: CPT

## 2025-04-28 PROCEDURE — 36415 COLL VENOUS BLD VENIPUNCTURE: CPT

## 2025-04-28 PROCEDURE — 96375 TX/PRO/DX INJ NEW DRUG ADDON: CPT

## 2025-04-28 PROCEDURE — 76705 ECHO EXAM OF ABDOMEN: CPT

## 2025-04-28 PROCEDURE — 85025 COMPLETE CBC W/AUTO DIFF WBC: CPT

## 2025-04-28 PROCEDURE — 80076 HEPATIC FUNCTION PANEL: CPT

## 2025-04-28 PROCEDURE — 84100 ASSAY OF PHOSPHORUS: CPT

## 2025-04-28 PROCEDURE — 81003 URINALYSIS AUTO W/O SCOPE: CPT

## 2025-04-28 PROCEDURE — 74177 CT ABD & PELVIS W/CONTRAST: CPT | Mod: MC

## 2025-04-28 PROCEDURE — 93005 ELECTROCARDIOGRAM TRACING: CPT

## 2025-04-28 PROCEDURE — 80053 COMPREHEN METABOLIC PANEL: CPT

## 2025-04-28 PROCEDURE — 78226 HEPATOBILIARY SYSTEM IMAGING: CPT | Mod: MC

## 2025-04-28 PROCEDURE — C1889: CPT

## 2025-04-28 PROCEDURE — 85610 PROTHROMBIN TIME: CPT

## 2025-04-28 PROCEDURE — A9537: CPT

## 2025-04-28 PROCEDURE — 96365 THER/PROPH/DIAG IV INF INIT: CPT

## 2025-04-28 RX ORDER — METRONIDAZOLE 250 MG
1 TABLET ORAL
Qty: 14 | Refills: 0
Start: 2025-04-28

## 2025-04-28 RX ORDER — CEFPODOXIME PROXETIL 200 MG/1
1 TABLET, FILM COATED ORAL
Qty: 14 | Refills: 0
Start: 2025-04-28

## 2025-04-28 RX ADMIN — Medication 40 MILLIGRAM(S): at 05:26

## 2025-04-28 RX ADMIN — Medication 1000 MILLIGRAM(S): at 06:00

## 2025-04-28 RX ADMIN — Medication 1000 MILLIGRAM(S): at 11:06

## 2025-04-28 RX ADMIN — Medication 25 GRAM(S): at 05:27

## 2025-04-28 RX ADMIN — Medication 1000 MILLIGRAM(S): at 01:18

## 2025-04-28 RX ADMIN — Medication 1000 MILLIGRAM(S): at 05:26

## 2025-04-28 RX ADMIN — Medication 1000 MILLIGRAM(S): at 00:45

## 2025-04-28 NOTE — DISCHARGE NOTE PROVIDER - CARE PROVIDER_API CALL
Jose R De La Garza  [facility]  [address], [address]  [place] [address]  Phone: [phone]  Fax: [phone]  Follow Up Time:     Elvis Lawrence  Surgery  [address] [place] [address]  Phone: [phone]  Fax: [phone]  Follow Up Time:

## 2025-04-28 NOTE — DISCHARGE NOTE PROVIDER - HOSPITAL COURSE
59m in recovery x 30 years with DM on insulin pump, BPH, HLD,  p/w abd pain. Pain started last night after eating [employer].  pain is RUQ, nonradiating, and was severe. pt reports nausea and anorexia.  Of note pt had EGD yesterday and was told there were ulcers. Pt has no cardiac history, has good exercise tolerance, and denies cp, palpitations, and sob. PT was found to have acute gangrenous cholecystitis. HE was treated with IV abx and underwent lap melanie without complication.  LABS:                        12.6   9.66  )-----------( 215      ( 27 Apr 2025 09:10 )             36.8     04-27    136  |  104  |  17  ----------------------------<  299[H]  3.8   |  26  |  1.10    Ca    8.9      27 Apr 2025 09:10    TPro  7.0  /  Alb  2.6[L]  /  TBili  2.3[H]  /  DBili  0.7[H]  /  AST  33  /  ALT  64  /  AlkPhos  94  04-27    RADIOLOGY & ADDITIONAL TESTS:  < from: CT Abdomen and Pelvis w/ IV Cont (04.24.25 @ 11:46) >    LOWER CHEST: Dependent atelectatic changes. Tiny hiatal hernia    LIVER: Within normal limits.  BILE DUCTS: Normal caliber.  GALLBLADDER: Cholelithiasis. Pericholecystic stranding raising   possibility of cholecystitis. Correlate with symptomatology and right   upper quadrant ultrasound..  SPLEEN: Within normal limits.  PANCREAS: Within normal limits.  ADRENALS: Within normal limits.  KIDNEYS/URETERS: Left renal cyst. No hydronephrosis. Bilateral   nonspecific perinephric stranding    BLADDER: Markedly distended without wall thickening  REPRODUCTIVE ORGANS: Normal prostate    BOWEL: No bowel obstruction or active inflammation. Fluid and gas-filled   periampullary duodenal diverticulum. Appendix no pericecal inflammation   to suggest appendicitis  PERITONEUM/RETROPERITONEUM: Within normal limits.  VESSELS: Nonaneurysmal.  LYMPH NODES: No lymphadenopathy.  ABDOMINAL WALL: Mild widening bilateral inguinal ring with fat. Small   fat-containing umbilical hernia  BONES: Degenerative changes. Mild loss of height of T12 vertebral body   probably chronic    IMPRESSION:  Cholelithiasis with pericholecystic stranding. Cholecystitis is a   consideration. Correlate with the right upper quadrant ultrasound    < end of copied text >    < from: US Abdomen Upper Quadrant Right (04.24.25 @ 13:18) >    1. Technically limited examination.  2. The gallbladder is moderately distended, with wall thickening and   possible intraluminal sludge. The stone in the region of the gallbladder   neck seen on the prior CT was not visualized on this examination;   however, the gallbladder neck was not visualized due to technical   limitations. A sonographic Anderson sign was not elicited; however, this   would be unreliable if the patient was administered pain medication prior   to the examination. Suggest clinical correlation. The CT findings remain   concerning for acute cholecystitis. HIDA scan could be performed for   further evaluation as clinically warranted. Surgical consultation is   suggested.    < end of copied text >    < from: NM Hepatobiliary Imaging (04.24.25 @ 16:32) >    IMPRESSION: Abnormal hepatobiliary scan. Findings compatible with acute   cholecystitis.    < end of copied text >

## 2025-04-28 NOTE — PROGRESS NOTE ADULT - SUBJECTIVE AND OBJECTIVE BOX
POD #3: lap cholecystectomy  SANDRO MATAMOROS    SUBJECTIVE:  Patient seen and examined at bedside this morning, denies any acute complaints, reports that he is tolerating regular diet, denies any fever or chills, passing gas but has not had a bowel movement yet. He reports that he feels short or breath and finds it more difficult to walk. Denies any fever, chills or chest pain.    MEDICATIONS  (STANDING):  acetaminophen     Tablet .. 1000 milliGRAM(s) Oral every 6 hours  atorvastatin 10 milliGRAM(s) Oral at bedtime  dextrose 5%. 1000 milliLiter(s) (50 mL/Hr) IV Continuous <Continuous>  dextrose 5%. 1000 milliLiter(s) (100 mL/Hr) IV Continuous <Continuous>  glucagon  Injectable 1 milliGRAM(s) IntraMuscular once  insulin aspart (NovoLOG) Pump 1 Each SubCutaneous Continuous Pump  pantoprazole    Tablet 40 milliGRAM(s) Oral two times a day  piperacillin/tazobactam IVPB.. 3.375 Gram(s) IV Intermittent every 8 hours  tamsulosin 0.4 milliGRAM(s) Oral at bedtime    MEDICATIONS  (PRN):  aluminum hydroxide/magnesium hydroxide/simethicone Suspension 30 milliLiter(s) Oral every 4 hours PRN Dyspepsia  melatonin 3 milliGRAM(s) Oral at bedtime PRN Insomnia  ondansetron Injectable 4 milliGRAM(s) IV Push every 8 hours PRN Nausea and/or Vomiting      Vital Signs Last 24 Hrs  T(C): 36.5 (28 Apr 2025 05:25), Max: 36.7 (27 Apr 2025 11:30)  T(F): 97.7 (28 Apr 2025 05:25), Max: 98.1 (27 Apr 2025 11:30)  HR: 73 (28 Apr 2025 05:25) (73 - 101)  BP: 154/81 (28 Apr 2025 05:25) (152/74 - 157/75)  BP(mean): --  RR: 18 (28 Apr 2025 05:25) (18 - 19)  SpO2: 91% (28 Apr 2025 05:25) (90% - 91%)    Parameters below as of 28 Apr 2025 05:25  Patient On (Oxygen Delivery Method): room air        PHYSICAL EXAM:  Constitutional: AAOx3, no acute distress  HEENT: NCAT, airway patent  Cardiovascular: RRR, pulses present bilaterally  Respiratory: nonlabored breathing  Gastrointestinal: abdomen soft, nontender, non distended, no rebound or guarding, no palpable masses, incisions are clean, dry and intact without any surrounding erythema, drainage, bleeding or signs of infection, GABI with minimal serous/clot output  Neuro: no focal deficits  Extremities: non edematous, no calf pain bilaterally       I&O's Detail    27 Apr 2025 07:01  -  28 Apr 2025 07:00  --------------------------------------------------------  IN:    IV PiggyBack: 200 mL    Oral Fluid: 200 mL  Total IN: 400 mL    OUT:    Drain (mL): 30 mL  Total OUT: 30 mL    Total NET: 370 mL          LABS:  [pending]

## 2025-04-28 NOTE — CONSULT NOTE ADULT - ASSESSMENT
59m in recovery x 30 years with DM on insulin pump, BPH, HLD,  p/w acute cholecystitis    Acute Cholecystitis  - imaging consistent w/ acute cholecystitis  - Laparoscopic cholecystectomy w cholangiography 25-Apr-2025 22:06:50  Elvis Lawrence    Recommendations:   Can send patient on ceftin 500mg BID and flagyl 500mg BID to complete x7 days  D/c planning per primary team    Patient evaluated with face-to-face time in addition to reviewing history, labs, microbiology, and imaging.   Antibiotic stewardship, local antibiogram, infection control strategies and potential transmission issues taken into consideration at time of treatment decision making process.   Thank you for allowing us to participate in the care of your patient.  D/w Dr. Ballard  Infectious Diseases will follow. Please call with any questions.  Keke Marie M.D.  Available on [employer] TEAMS -- *[facility]*  [facility] [phone]  For after 5 P.M. and weekends, please call [phone]

## 2025-04-28 NOTE — DISCHARGE NOTE PROVIDER - NSDCMRMEDTOKEN_GEN_ALL_CORE_FT
aspirin 81 mg oral tablet: 1 tablet with sensor orally once a day  cefpodoxime 200 mg oral tablet: 1 tab(s) orally 2 times a day  Flomax 0.4 mg oral capsule: 1 cap(s) orally once a day (at bedtime)  Lipitor 10 mg oral tablet: 1 tab(s) orally once a day (at bedtime)  MetFORMIN (Eqv-Fortamet) 1000 mg oral tablet, extended release: 1 tab(s) orally 2 times a day  metroNIDAZOLE 500 mg oral tablet: 1 tab(s) orally 2 times a day

## 2025-04-28 NOTE — DISCHARGE NOTE PROVIDER - PREFACE STATEMENT FOR MINUTES SPENT
"Chief Complaint   Patient presents with     Hypertension       Initial BP (!) 202/114   Pulse 57   Wt 92.5 kg (204 lb)   SpO2 98%   BMI 33.95 kg/m   Estimated body mass index is 33.95 kg/m  as calculated from the following:    Height as of 10/31/18: 1.651 m (5' 5\").    Weight as of this encounter: 92.5 kg (204 lb).  Medication Reconciliation: complete    Cathy Greene LPN  "
I personally spent

## 2025-04-28 NOTE — DISCHARGE NOTE NURSING/CASE MANAGEMENT/SOCIAL WORK - FINANCIAL ASSISTANCE
[facility] provides services at a reduced cost to those who are determined to be eligible through [facility]’s financial assistance program. Information regarding [facility]’s financial assistance program can be found by going to [email or web address] or by calling [phone].

## 2025-04-28 NOTE — PROGRESS NOTE ADULT - REASON FOR ADMISSION
59y A1C with Estimated Average Glucose Result: 9.0 % (04-26-25 @ 08:27)  A1C with Estimated Average Glucose Result: 8.9 % (04-25-25 @ 07:01)   diabetes mellitus uncontrolled type 2

## 2025-04-28 NOTE — PROGRESS NOTE ADULT - SUBJECTIVE AND OBJECTIVE BOX
CAPILLARY BLOOD GLUCOSE          Vital Signs Last 24 Hrs  T(C): 36.5 (28 Apr 2025 05:25), Max: 36.7 (27 Apr 2025 11:30)  T(F): 97.7 (28 Apr 2025 05:25), Max: 98.1 (27 Apr 2025 11:30)  HR: 73 (28 Apr 2025 05:25) (73 - 101)  BP: 154/81 (28 Apr 2025 05:25) (152/74 - 157/75)  BP(mean): --  RR: 18 (28 Apr 2025 05:25) (18 - 19)  SpO2: 91% (28 Apr 2025 05:25) (90% - 91%)    Parameters below as of 28 Apr 2025 05:25  Patient On (Oxygen Delivery Method): room air        General: WN/WD NAD  Respiratory: CTA B/L  CV: RRR, S1S2, no murmurs, rubs or gallops  Abdominal: Soft, NT, ND +BS, Last BM  Extremities: No edema, + peripheral pulses     04-27    136  |  104  |  17  ----------------------------<  299[H]  3.8   |  26  |  1.10    Ca    8.9      27 Apr 2025 09:10  Phos  2.4     04-26  Mg     2.3     04-26    TPro  7.0  /  Alb  2.6[L]  /  TBili  2.3[H]  /  DBili  0.7[H]  /  AST  33  /  ALT  64  /  AlkPhos  94  04-27      atorvastatin 10 milliGRAM(s) Oral at bedtime  glucagon  Injectable 1 milliGRAM(s) IntraMuscular once  insulin aspart (NovoLOG) Pump 1 Each SubCutaneous Continuous Pump  insulin lispro (ADMELOG) corrective regimen sliding scale   SubCutaneous three times a day before meals  insulin lispro (ADMELOG) corrective regimen sliding scale   SubCutaneous at bedtime

## 2025-04-28 NOTE — CONSULT NOTE ADULT - SUBJECTIVE AND OBJECTIVE BOX
[facility]  PRESTON Ge S. Shah, Y. Patel, [facility]. [facility]  [phone]    DECLAN PEÑA  59y, Male  [id number]    HPI--  HPI:  59m in recovery x 30 years with DM on insulin pump, BPH, HLD,  p/w abd pain. Pain started last night after eating chik-argentina-a.  pain is RUQ, nonradiating, and was severe. pt reports nausea and anorexia.  Of note pt had EGD yesterday and was told there were ulcers. Pt has no cardiac history, has good exercise tolerance, and denies cp, palpitations, and sob. (24 Apr 2025 21:26)    Active Medications--  acetaminophen     Tablet .. 1000 milliGRAM(s) Oral every 6 hours  aluminum hydroxide/magnesium hydroxide/simethicone Suspension 30 milliLiter(s) Oral every 4 hours PRN  atorvastatin 10 milliGRAM(s) Oral at bedtime  dextrose 5%. 1000 milliLiter(s) IV Continuous <Continuous>  dextrose 5%. 1000 milliLiter(s) IV Continuous <Continuous>  glucagon  Injectable 1 milliGRAM(s) IntraMuscular once  insulin aspart (NovoLOG) Pump 1 Each SubCutaneous Continuous Pump  melatonin 3 milliGRAM(s) Oral at bedtime PRN  ondansetron Injectable 4 milliGRAM(s) IV Push every 8 hours PRN  pantoprazole    Tablet 40 milliGRAM(s) Oral two times a day  piperacillin/tazobactam IVPB.. 3.375 Gram(s) IV Intermittent every 8 hours  tamsulosin 0.4 milliGRAM(s) Oral at bedtime    Antimicrobials:   piperacillin/tazobactam IVPB.. 3.375 Gram(s) IV Intermittent every 8 hours    Immunologic:     ROS:  CONSTITUTIONAL: No fevers or chills. No weakness or headache. No weight changes.  EYES/ENT: No visual or hearing changes. No sore throat or throat pain .  NECK: No pain or stiffness  RESPIRATORY: No cough, wheezing, or hemoptysis. No shortness of breath  CARDIOVASCULAR: No chest pain or palpitations  GASTROINTESTINAL: No abdominal pain. No nausea or vomiting. No diarrhea or constipation.  GENITOURINARY: No dysuria, frequency or hematuria  NEUROLOGICAL: No numbness or weakness  SKIN: No itching or rashes  PSYCHIATRIC: Pleasant. Appropriate affect    Allergies: No Known Allergies    PMH -- DM (diabetes mellitus), type 1    HLD (hyperlipidemia)      PSH -- No significant past surgical history      FH --   Social History --  EtOH: denies   Tobacco: denies   Drug Use: denies     Travel/Environmental/Occupational History:    Physical Exam--  Vital Signs Last 24 Hrs  T(F): 98.3 (28 Apr 2025 11:36), Max: 98.3 (28 Apr 2025 11:36)  HR: 89 (28 Apr 2025 11:36) (73 - 101)  BP: 167/90 (28 Apr 2025 11:36) (152/74 - 167/90)  RR: 18 (28 Apr 2025 11:36) (18 - 19)  SpO2: 91% (28 Apr 2025 11:36) (91% - 91%)  General: nontoxic-appearing, no acute distress  HEENT: NC/AT, EOMI  Lungs: no use resp acc muscles  Heart: Regular rate and rhythm.   Abdomen: Soft. Nondistended. Nontender.   Extremities: No cyanosis or clubbing. No edema.   Skin: Warm. Dry. Good turgor. No rash.     Laboratory & Imaging Data:  CBC:                       12.6   9.66  )-----------( 215      ( 27 Apr 2025 09:10 )             36.8     CMP: 04-27    136  |  104  |  17  ----------------------------<  299[H]  3.8   |  26  |  1.10    Ca    8.9      27 Apr 2025 09:10    TPro  7.0  /  Alb  2.6[L]  /  TBili  2.3[H]  /  DBili  0.7[H]  /  AST  33  /  ALT  64  /  AlkPhos  94  04-27    LIVER FUNCTIONS - ( 27 Apr 2025 09:10 )  Alb: 2.6 g/dL / Pro: 7.0 g/dL / ALK PHOS: 94 U/L / ALT: 64 U/L / AST: 33 U/L / GGT: x           Urinalysis Basic - ( 27 Apr 2025 09:10 )    Color: x / Appearance: x / SG: x / pH: x  Gluc: 299 mg/dL / Ketone: x  / Bili: x / Urobili: x   Blood: x / Protein: x / Nitrite: x   Leuk Esterase: x / RBC: x / WBC x   Sq Epi: x / Non Sq Epi: x / Bacteria: x        Microbiology: reviewed        Radiology: reviewed    < from: NM Hepatobiliary Imaging (04.24.25 @ 16:32) >    ACC: [id number] EXAM:  NM HEPATOBILIARY IMG   ORDERED BY: DORIAN SILVA     PROCEDURE DATE:  04/24/2025          INTERPRETATION:  RADIOPHARMACEUTICAL: 3.2 mCi Tc-99m-Mebrofenin, I.V.    CLINICAL INFORMATION: 59 years-old Male with right upper quadrant pain;   CT showed gallstone near the gallbladder neck, with pericholecystic   stranding raising the possibility of cholecystitis.  Patient is referred   to evaluate for acute cholecystitis.    TECHNIQUE:  Dynamic imaging of the anterior abdomen was performed for 2   hours following radiopharmaceutical injection. Static images of the   abdomen in the anterior, right anterior oblique and right lateral views   were obtained immediately thereafter. Morphine augmented hepatobiliary   imaging not performed due to patient's preference.    COMPARISON: No prior HIDA scan.    OTHER STUDIES USED FOR CORRELATION: CT abdomen pelvis 4/24/2024    FINDINGS: There is prompt, homogeneous uptake of radiopharmaceutical by   the hepatocytes; there is suggestion ofrim sign in the inferolateral   aspect of the right hepatic lobe that is sometimes seen with   pericholecystic edema. Activity is first seen in the bowel at about 20   minutes. Gallbladder is not visualized despite prolonged imaging; in the   proper clinical setting with additional findings of rim sign; findings   are compatible with acute cholecystitis. There is good clearance of   activity from the liver by the end of the study.    IMPRESSION: Abnormal hepatobiliary scan. Findings compatible with acute   cholecystitis.    Results relayed to surgical ANABEL Turk at 4:31 PM on 4/24/2025 by Dr. Tipton   with readback confirmation.    --- End of Report ---            JUAN TIPTON MD; Attending Radiologist  This document has been electronically signed.Apr 24 2025  4:43PM    < end of copied text >  < from: US Abdomen Upper Quadrant Right (04.24.25 @ 13:18) >    ACC: [id number] EXAM:  US ABDOMEN RT UPR QUADRANT   ORDERED BY: DORIAN SILVA     PROCEDURE DATE:  04/24/2025          INTERPRETATION:  CLINICAL INFORMATION: 59-year-old male for evaluation of   acute cholecystitis    COMPARISON: Abdominal pelvic CT 4/24/2025    TECHNIQUE: Sonography of the right upper quadrant.    FINDINGS:  As per sonographer report, the study is technically limited due to   interposed bowel gas.  Liver: Partially obscured by shadowing from overlying bowel and ribs. No   focal abnormality is identified in the visualized portion.  Bile ducts: Normal caliber. Common bile duct measures 3 mm.  Gallbladder: Suboptimal visualization. The gallbladder is moderately   distended. The gallbladder wall is thickened measuring up to 5 mm. There   are low level echoes present within the gallbladder lumen which could be   artifactual in nature; however, intraluminal sludge also cannot be   excluded. The stone seen in the region of the gallbladder neck on the CT   scan was not visualized on this examination; however, the region of the   gallbladder neck was not visualized. No pericholecystic fluid is   visualized. As per sonographer report, a sonographic Anderson sign was not   elicited; however, this may be unreliable if the patient was administered   pain medication prior to the exam.  Pancreas: Not visualized due to overlying bowel.  Right kidney: 11.0 cm. No hydronephrosis.  Ascites: None.  IVC/aorta: Poorly visualized    IMPRESSION:    1. Technically limited examination.  2. The gallbladder is moderately distended, with wall thickening and   possible intraluminal sludge. The stone in the region of the gallbladder   neck seen on the prior CT was not visualized on this examination;   however, the gallbladder neck was not visualized due to technical   limitations. A sonographic Anderson sign was not elicited; however, this   would be unreliable if the patient was administered pain medication prior   to the examination. Suggest clinical correlation. The CT findings remain   concerning for acute cholecystitis. HIDA scan could be performed for   further evaluation as clinically warranted. Surgical consultation is   suggested.    Findings were discussed with Dr. DORIAN SILVA [phone] 4/24/2025 1:36 PM   by Dr. Pearson with read back confirmation.    --- End of Report ---            HAI PEARSON MD; Attending Radiologist  This document has been electronically signed. Apr 24 2025  1:42PM    < end of copied text >  < from: CT Abdomen and Pelvis w/ IV Cont (04.24.25 @ 11:46) >    ACC: [id number] EXAM:  CT ABDOMEN AND PELVIS IC   ORDERED BY: DORIAN SILVA     PROCEDURE DATE:  04/24/2025          INTERPRETATION:  CLINICAL INFORMATION: Right-sided abdominal pain status   post endoscopy yesterday    COMPARISON: None.    CONTRAST/COMPLICATIONS:  IV Contrast: Omnipaque 350  90 cc administered   10 cc discarded  Oral Contrast: NONE  .    PROCEDURE:  CT of the Abdomen and Pelvis was performed.  Sagittal and coronal reformats were performed.    FINDINGS:  LOWER CHEST: Dependent atelectatic changes. Tiny hiatal hernia    LIVER: Within normal limits.  BILE DUCTS: Normal caliber.  GALLBLADDER: Cholelithiasis. Pericholecystic stranding raising   possibility of cholecystitis. Correlate with symptomatology and right   upper quadrant ultrasound..  SPLEEN: Within normal limits.  PANCREAS: Within normal limits.  ADRENALS: Within normal limits.  KIDNEYS/URETERS: Left renal cyst. No hydronephrosis. Bilateral   nonspecific perinephric stranding    BLADDER: Markedly distended without wall thickening  REPRODUCTIVE ORGANS: Normal prostate    BOWEL: No bowel obstruction or active inflammation. Fluid and gas-filled   periampullary duodenal diverticulum. Appendix no pericecal inflammation   to suggest appendicitis  PERITONEUM/RETROPERITONEUM: Within normal limits.  VESSELS: Nonaneurysmal.  LYMPH NODES: No lymphadenopathy.  ABDOMINAL WALL: Mild widening bilateral inguinal ring with fat. Small   fat-containing umbilical hernia  BONES: Degenerative changes. Mild loss of height of T12 vertebral body   probably chronic    IMPRESSION:  Cholelithiasis with pericholecystic stranding. Cholecystitis is a   consideration. Correlate with the right upper quadrant ultrasound    Additional findings as discussed    --- End of Report ---            MAGDALENO PENA MD; Attending Radiologist  This document has been electronically signed. Apr 24 2025 12:10PM    < end of copied text >

## 2025-04-28 NOTE — PROGRESS NOTE ADULT - PROBLEM SELECTOR PLAN 1
cont omnipod 5 insulin pump settings  cont cons cho diet  goal bg 100-180 in hosp setting  pt to f/u with outpatient endocrinology
Type 1 A1c 9.0%   Recommend endocrine-Perlman on consult  omni pod with Novolog insulin/ CGM auto mode- correction scales dc'd  FU appt: JENNIFER  DSC recommendations: return to home pumping regimen and CGM/glucose monitoring  diabetes education provided  Goal 100-180 mg/dL; 140-180 mg/dL in critical care areas
omnipod 5 insulin pump resumed today  cont fs bg monitoring  cont cons cho diet  bg goal 100-180 in hosp setting

## 2025-04-28 NOTE — PROGRESS NOTE ADULT - ASSESSMENT
58y/o Male PMHx Gilbert syndrome, diabetes, HLD who presents with RUQ pain and nausea found to have positive HIDA scan consistent with acute cholecystitis, now POD#3 s/p lap cholecystectomy. VSSAF, exam consistent with  post-op state.     Plan:  - f/u AM labs  - d/c home pending labs   - dc with vantin/flagyl x7 days  - to be dc home with drain  - OOBA/IS  - pain management without narcotics

## 2025-04-28 NOTE — CASE MANAGEMENT PROGRESS NOTE - NSCMPROGRESSNOTE_GEN_ALL_CORE
Discharge order noted. Patient will be discharged home with a GABI drain. CM discussed home care services for a visiting nurse. Home care choice list provided. Patient declined and stated his spouse is a RN and will manage drain until outpatient Surgery follow up. Patient stated the surgical team advised him that Dr. Lawrence will see him today prior to discharge. CM spoke to surgical PA who confirmed. CM remains available.

## 2025-04-28 NOTE — PROGRESS NOTE ADULT - PROVIDER SPECIALTY LIST ADULT
Surgery
Surgery
Diabetes
Endocrinology
Hospitalist
Surgery
Hospitalist
Hospitalist
Surgery
Endocrinology

## 2025-04-28 NOTE — CONSULT NOTE ADULT - CONSULT REASON
r/o cholecystitis
Acute Cholecystitis  PO recs
dm1 uncontrolled
59y  diabetes mellitus uncontrolled type 1.5

## 2025-04-28 NOTE — CAREGIVER ENGAGEMENT NOTE - CAREGIVER OUTREACH NOTES - FREE TEXT
CM met with the patient and his spouse at the bedside to discuss transition planning. Patient's spouse (RN) confirmed that she is able to care for the patient's GABI drain and doesn't require a visiting nurse to be arranged. CM spoke to surgical PA regarding follow up appointment; surgical PA stated she will speak to Dr. Lawrence and enter on discharge documentation. Patient and patient's spouse verbalized understanding of the transition plan for today and are in agreement. Discharge notice reviewed/patient signed/copy provided. Patient's spouse will transport the patient home. Bedside RN made aware of the stated above. CM remains available.

## 2025-04-28 NOTE — PROGRESS NOTE ADULT - ASSESSMENT
Physical Exam:   Vital Signs Last 24 Hrs  T(C): 36.5 (28 Apr 2025 05:25), Max: 36.7 (27 Apr 2025 11:30)  T(F): 97.7 (28 Apr 2025 05:25), Max: 98.1 (27 Apr 2025 11:30)  HR: 73 (28 Apr 2025 05:25) (73 - 101)  BP: 154/81 (28 Apr 2025 05:25) (152/74 - 157/75)  BP(mean): --  RR: 18 (28 Apr 2025 05:25) (18 - 19)  SpO2: 91% (28 Apr 2025 05:25) (90% - 91%)    Parameters below as of 28 Apr 2025 05:25  Patient On (Oxygen Delivery Method): room air             CAPILLARY BLOOD GLUCOSE          Cholesterol, Serum: 113 mg/dL (05.19.21 @ 08:36)     HDL Cholesterol, Serum: 22 mg/dL (05.19.21 @ 08:36)     LDL Cholesterol Calculated: 66 mg/dL (05.19.21 @ 08:36)     DIET: CC  >50%

## 2025-04-28 NOTE — DISCHARGE NOTE PROVIDER - NSDCCPCAREPLAN_GEN_ALL_CORE_FT
PRINCIPAL DISCHARGE DIAGNOSIS  Diagnosis: Cholecystitis  Assessment and Plan of Treatment:       SECONDARY DISCHARGE DIAGNOSES  Diagnosis: Insulin-treated type 2 diabetes mellitus  Assessment and Plan of Treatment:

## 2025-04-28 NOTE — DISCHARGE NOTE PROVIDER - NSDCFUSCHEDAPPT_GEN_ALL_CORE_FT
Nichole Lyn  [facility]  NEUROLOGY [address] [facility]  Scheduled Appointment: 06/25/2025    Dickson An  [facility]  ENDOCRIN [address]  Scheduled Appointment: 07/01/2025

## 2025-04-28 NOTE — PATIENT CHOICE NOTE. - NSPTCHOICESTATE_GEN_ALL_CORE
I have met with the patient and/or caregiver to discuss discharge goals and treatment plan. Patient and/or caregiver also provided with instructions on accessing the CMS Compare websites for additional information related to Post Acute Provider quality and resource use measures to assist them in evaluation of the providers and in selecting their post-acute provider of choice. Patient and caregiver were informed of the facilities that are owned and/or operated by [facility]. I have discussed with the patient the availability of in-network facilities and providers. Patient and caregiver provided with a list of post-acute providers whose services are appropriate to the discharge plans and patient needs.     For patient requiring durable medical equipment, patient and/or caregiver were informed that they have the right to request who provides the required equipment.
I have met with the patient and/or caregiver to discuss discharge goals and treatment plan. Patient and/or caregiver also provided with instructions on accessing the CMS Compare websites for additional information related to Post Acute Provider quality and resource use measures to assist them in evaluation of the providers and in selecting their post-acute provider of choice. Patient and caregiver were informed of the facilities that are owned and/or operated by [facility]. I have discussed with the patient the availability of in-network facilities and providers. Patient and caregiver provided with a list of post-acute providers whose services are appropriate to the discharge plans and patient needs.     For patient requiring durable medical equipment, patient and/or caregiver were informed that they have the right to request who provides the required equipment.

## 2025-04-28 NOTE — PROGRESS NOTE ADULT - SUBJECTIVE AND OBJECTIVE BOX
Patient is a 59y old  Male who presents with a chief complaint of     HPI:  59m in recovery x 30 years with DM on insulin pump, BPH, HLD,  p/w abd pain. Pain started last night after eating chik-[employer]-a.  pain is RUQ, nonradiating, and was severe. pt reports nausea and anorexia.  Of note pt had EGD yesterday and was told there were ulcers. Pt has no cardiac history, has good exercise tolerance, and denies cp, palpitations, and sob. (24 Apr 2025 21:26)      PAST MEDICAL & SURGICAL HISTORY:  DM (diabetes mellitus), type 1      HLD (hyperlipidemia)      No significant past surgical history          Allergies    No Known Allergies    Intolerances    opioid-like analgesics (Other)      MEDICATIONS  (STANDING):  acetaminophen     Tablet .. 1000 milliGRAM(s) Oral every 6 hours  atorvastatin 10 milliGRAM(s) Oral at bedtime  dextrose 5%. 1000 milliLiter(s) (50 mL/Hr) IV Continuous <Continuous>  dextrose 5%. 1000 milliLiter(s) (100 mL/Hr) IV Continuous <Continuous>  glucagon  Injectable 1 milliGRAM(s) IntraMuscular once  insulin aspart (NovoLOG) Pump 1 Each SubCutaneous Continuous Pump  pantoprazole    Tablet 40 milliGRAM(s) Oral two times a day  piperacillin/tazobactam IVPB.. 3.375 Gram(s) IV Intermittent every 8 hours  tamsulosin 0.4 milliGRAM(s) Oral at bedtime

## 2025-04-28 NOTE — DISCHARGE NOTE PROVIDER - CARE PROVIDERS DIRECT ADDRESSES
,[email or web address]

## 2025-04-28 NOTE — DISCHARGE NOTE NURSING/CASE MANAGEMENT/SOCIAL WORK - NSDCPEFALRISK_GEN_ALL_CORE
For information on Fall & Injury Prevention, visit: [email or web address] OR  [email or web address] OR  https://www.cdc.gov/steadi/patient.html

## 2025-04-28 NOTE — PROGRESS NOTE ADULT - PROBLEM SELECTOR PROBLEM 1
Uncontrolled type 2 diabetes mellitus with hyperglycemia
Uncontrolled type 2 diabetes mellitus with hyperglycemia
Uncontrolled type 1 diabetes mellitus with hyperglycemia

## 2025-04-28 NOTE — DISCHARGE NOTE NURSING/CASE MANAGEMENT/SOCIAL WORK - PATIENT PORTAL LINK FT
You can access the FollowMyHealth Patient Portal offered by [facility] by registering at the following website: [email or web address]. By joining [facility]’s FollowMyHealth portal, you will also be able to view your health information using other applications (apps) compatible with our system.

## 2025-04-29 ENCOUNTER — APPOINTMENT (OUTPATIENT)
Dept: SURGERY | Facility: CLINIC | Age: 60
End: 2025-04-29
Payer: COMMERCIAL

## 2025-04-29 VITALS
DIASTOLIC BLOOD PRESSURE: 82 MMHG | TEMPERATURE: 98.1 F | HEIGHT: 71 IN | WEIGHT: 230.6 LBS | SYSTOLIC BLOOD PRESSURE: 146 MMHG | OXYGEN SATURATION: 99 % | BODY MASS INDEX: 32.28 KG/M2 | HEART RATE: 93 BPM

## 2025-04-29 DIAGNOSIS — Z87.19 PERSONAL HISTORY OF OTHER DISEASES OF THE DIGESTIVE SYSTEM: ICD-10-CM

## 2025-04-29 DIAGNOSIS — Z83.3 FAMILY HISTORY OF DIABETES MELLITUS: ICD-10-CM

## 2025-04-29 DIAGNOSIS — Z82.49 FAMILY HISTORY OF ISCHEMIC HEART DISEASE AND OTHER DISEASES OF THE CIRCULATORY SYSTEM: ICD-10-CM

## 2025-04-29 DIAGNOSIS — Z80.42 FAMILY HISTORY OF MALIGNANT NEOPLASM OF PROSTATE: ICD-10-CM

## 2025-04-29 PROBLEM — E78.5 HYPERLIPIDEMIA, UNSPECIFIED: Chronic | Status: ACTIVE | Noted: 2025-04-24

## 2025-04-29 PROBLEM — E10.9 TYPE 1 DIABETES MELLITUS WITHOUT COMPLICATIONS: Chronic | Status: ACTIVE | Noted: 2025-04-24

## 2025-04-29 PROCEDURE — 99024 POSTOP FOLLOW-UP VISIT: CPT

## 2025-04-29 RX ORDER — TAMSULOSIN HYDROCHLORIDE 0.4 MG/1
0.4 CAPSULE ORAL
Refills: 0 | Status: ACTIVE | COMMUNITY

## 2025-04-29 RX ORDER — CHROMIUM 200 MCG
TABLET ORAL
Refills: 0 | Status: ACTIVE | COMMUNITY

## 2025-04-29 RX ORDER — CEFPODOXIME PROXETIL 200 MG/1
200 TABLET, FILM COATED ORAL DAILY
Refills: 0 | Status: ACTIVE | COMMUNITY

## 2025-04-29 RX ORDER — METRONIDAZOLE 375 MG/1
CAPSULE ORAL
Refills: 0 | Status: ACTIVE | COMMUNITY

## 2025-04-29 RX ORDER — OMEPRAZOLE 20 MG/1
20 TABLET, ORALLY DISINTEGRATING, DELAYED RELEASE ORAL
Refills: 0 | Status: ACTIVE | COMMUNITY

## 2025-04-29 RX ORDER — ASPIRIN 81 MG
81 TABLET,CHEWABLE ORAL
Refills: 0 | Status: ACTIVE | COMMUNITY

## 2025-04-30 LAB — SURGICAL PATHOLOGY STUDY: SIGNIFICANT CHANGE UP

## 2025-05-01 ENCOUNTER — APPOINTMENT (OUTPATIENT)
Dept: SURGERY | Facility: CLINIC | Age: 60
End: 2025-05-01
Payer: COMMERCIAL

## 2025-05-01 VITALS
HEIGHT: 71 IN | BODY MASS INDEX: 32.35 KG/M2 | HEART RATE: 95 BPM | WEIGHT: 231.04 LBS | OXYGEN SATURATION: 97 % | SYSTOLIC BLOOD PRESSURE: 124 MMHG | TEMPERATURE: 97.6 F | DIASTOLIC BLOOD PRESSURE: 78 MMHG

## 2025-05-01 DIAGNOSIS — Z90.49 ACQUIRED ABSENCE OF OTHER SPECIFIED PARTS OF DIGESTIVE TRACT: ICD-10-CM

## 2025-05-01 PROCEDURE — 99024 POSTOP FOLLOW-UP VISIT: CPT

## 2025-05-06 ENCOUNTER — APPOINTMENT (OUTPATIENT)
Dept: SURGERY | Facility: CLINIC | Age: 60
End: 2025-05-06

## 2025-05-14 ENCOUNTER — APPOINTMENT (OUTPATIENT)
Dept: ENDOCRINOLOGY | Facility: CLINIC | Age: 60
End: 2025-05-14

## 2025-05-14 VITALS
DIASTOLIC BLOOD PRESSURE: 82 MMHG | HEIGHT: 71 IN | SYSTOLIC BLOOD PRESSURE: 140 MMHG | OXYGEN SATURATION: 98 % | WEIGHT: 224.13 LBS | BODY MASS INDEX: 31.38 KG/M2 | HEART RATE: 73 BPM

## 2025-05-14 DIAGNOSIS — E78.5 HYPERLIPIDEMIA, UNSPECIFIED: ICD-10-CM

## 2025-05-14 DIAGNOSIS — E16.2 HYPOGLYCEMIA, UNSPECIFIED: ICD-10-CM

## 2025-05-14 DIAGNOSIS — E66.9 OBESITY, UNSPECIFIED: ICD-10-CM

## 2025-05-14 DIAGNOSIS — E11.9 TYPE 2 DIABETES MELLITUS W/OUT COMPLICATIONS: ICD-10-CM

## 2025-05-14 PROCEDURE — 36415 COLL VENOUS BLD VENIPUNCTURE: CPT

## 2025-05-14 PROCEDURE — 99214 OFFICE O/P EST MOD 30 MIN: CPT

## 2025-05-14 PROCEDURE — G2211 COMPLEX E/M VISIT ADD ON: CPT | Mod: NC

## 2025-05-15 ENCOUNTER — APPOINTMENT (OUTPATIENT)
Dept: SURGERY | Facility: CLINIC | Age: 60
End: 2025-05-15
Payer: COMMERCIAL

## 2025-05-15 VITALS
WEIGHT: 224.87 LBS | DIASTOLIC BLOOD PRESSURE: 72 MMHG | HEART RATE: 63 BPM | TEMPERATURE: 98.9 F | HEIGHT: 71 IN | OXYGEN SATURATION: 96 % | SYSTOLIC BLOOD PRESSURE: 142 MMHG | BODY MASS INDEX: 31.48 KG/M2

## 2025-05-15 DIAGNOSIS — Z90.49 ACQUIRED ABSENCE OF OTHER SPECIFIED PARTS OF DIGESTIVE TRACT: ICD-10-CM

## 2025-05-15 PROCEDURE — 99024 POSTOP FOLLOW-UP VISIT: CPT

## 2025-05-16 LAB
25(OH)D3 SERPL-MCNC: 40.2 NG/ML
ALBUMIN SERPL ELPH-MCNC: 4.3 G/DL
ALP BLD-CCNC: 92 U/L
ALT SERPL-CCNC: 34 U/L
ANION GAP SERPL CALC-SCNC: 13 MMOL/L
APO B SERPL-MCNC: 71 MG/DL
AST SERPL-CCNC: 15 U/L
BASOPHILS # BLD AUTO: 0.13 K/UL
BASOPHILS NFR BLD AUTO: 2.1 %
BILIRUB SERPL-MCNC: 1.2 MG/DL
BUN SERPL-MCNC: 10 MG/DL
CALCIUM SERPL-MCNC: 9.7 MG/DL
CHLORIDE SERPL-SCNC: 102 MMOL/L
CHOLEST SERPL-MCNC: 152 MG/DL
CO2 SERPL-SCNC: 25 MMOL/L
CREAT SERPL-MCNC: 1.08 MG/DL
CREAT SPEC-SCNC: 102 MG/DL
CRP SERPL-MCNC: 3 MG/L
EGFRCR SERPLBLD CKD-EPI 2021: 78 ML/MIN/1.73M2
EOSINOPHIL # BLD AUTO: 0.37 K/UL
EOSINOPHIL NFR BLD AUTO: 6 %
ESTIMATED AVERAGE GLUCOSE: 200 MG/DL
FRUCTOSAMINE SERPL-MCNC: 303 UMOL/L
GLUCOSE SERPL-MCNC: 211 MG/DL
GLYCOMARK.: 3.1 UG/ML
HBA1C MFR BLD HPLC: 8.6 %
HCT VFR BLD CALC: 40.2 %
HDLC SERPL-MCNC: 52 MG/DL
HGB BLD-MCNC: 13.7 G/DL
IMM GRANULOCYTES NFR BLD AUTO: 0.3 %
LDLC SERPL DIRECT ASSAY-MCNC: 79 MG/DL
LYMPHOCYTES # BLD AUTO: 1.75 K/UL
LYMPHOCYTES NFR BLD AUTO: 28.5 %
MAGNESIUM SERPL-MCNC: 2 MG/DL
MAN DIFF?: NORMAL
MCHC RBC-ENTMCNC: 29.2 PG
MCHC RBC-ENTMCNC: 34.1 G/DL
MCV RBC AUTO: 85.7 FL
MICROALBUMIN 24H UR DL<=1MG/L-MCNC: <1.2 MG/DL
MICROALBUMIN/CREAT 24H UR-RTO: NORMAL MG/G
MONOCYTES # BLD AUTO: 0.59 K/UL
MONOCYTES NFR BLD AUTO: 9.6 %
NEUTROPHILS # BLD AUTO: 3.27 K/UL
NEUTROPHILS NFR BLD AUTO: 53.5 %
PLATELET # BLD AUTO: 315 K/UL
POTASSIUM SERPL-SCNC: 4.9 MMOL/L
PROT SERPL-MCNC: 7.4 G/DL
RBC # BLD: 4.69 M/UL
RBC # FLD: 12.9 %
SODIUM SERPL-SCNC: 140 MMOL/L
T3FREE SERPL-MCNC: 2.93 PG/ML
T4 FREE SERPL-MCNC: 1.1 NG/DL
TRIGL SERPL-MCNC: 128 MG/DL
TSH SERPL-ACNC: 1.85 UIU/ML
WBC # FLD AUTO: 6.13 K/UL

## 2025-05-19 LAB
GAD65 AB SER-MCNC: 0 NMOL/L
PANC ISLET CELL AB SER QL: NORMAL

## 2025-06-25 ENCOUNTER — APPOINTMENT (OUTPATIENT)
Dept: NEUROLOGY | Facility: CLINIC | Age: 60
End: 2025-06-25
Payer: COMMERCIAL

## 2025-06-25 VITALS
HEART RATE: 79 BPM | DIASTOLIC BLOOD PRESSURE: 83 MMHG | WEIGHT: 224 LBS | TEMPERATURE: 98.2 F | HEIGHT: 71 IN | SYSTOLIC BLOOD PRESSURE: 145 MMHG | BODY MASS INDEX: 31.36 KG/M2

## 2025-06-25 PROBLEM — Z81.8 FAMILY HISTORY OF DEMENTIA: Status: ACTIVE | Noted: 2025-06-25

## 2025-06-25 PROBLEM — Z81.1 FAMILY HISTORY OF ALCOHOLISM: Status: ACTIVE | Noted: 2025-06-25

## 2025-06-25 PROBLEM — R41.3 MILD MEMORY DISTURBANCE: Status: ACTIVE | Noted: 2025-06-25

## 2025-06-25 PROCEDURE — 99204 OFFICE O/P NEW MOD 45 MIN: CPT

## 2025-07-01 ENCOUNTER — APPOINTMENT (OUTPATIENT)
Dept: ENDOCRINOLOGY | Facility: CLINIC | Age: 60
End: 2025-07-01
Payer: COMMERCIAL

## 2025-07-01 VITALS
SYSTOLIC BLOOD PRESSURE: 120 MMHG | DIASTOLIC BLOOD PRESSURE: 90 MMHG | HEIGHT: 71 IN | TEMPERATURE: 97.7 F | OXYGEN SATURATION: 97 % | WEIGHT: 224.31 LBS | BODY MASS INDEX: 31.4 KG/M2 | HEART RATE: 72 BPM

## 2025-07-01 LAB — GLUCOSE BLDC GLUCOMTR-MCNC: 176

## 2025-07-01 PROCEDURE — 99214 OFFICE O/P EST MOD 30 MIN: CPT

## 2025-07-01 PROCEDURE — 95251 CONT GLUC MNTR ANALYSIS I&R: CPT

## 2025-07-01 PROCEDURE — G2211 COMPLEX E/M VISIT ADD ON: CPT | Mod: NC

## 2025-07-08 ENCOUNTER — APPOINTMENT (OUTPATIENT)
Dept: MRI IMAGING | Facility: CLINIC | Age: 60
End: 2025-07-08
Payer: COMMERCIAL

## 2025-07-08 PROCEDURE — 70551 MRI BRAIN STEM W/O DYE: CPT

## 2025-07-14 ENCOUNTER — NON-APPOINTMENT (OUTPATIENT)
Age: 60
End: 2025-07-14

## 2025-08-20 ENCOUNTER — APPOINTMENT (OUTPATIENT)
Dept: SURGERY | Facility: CLINIC | Age: 60
End: 2025-08-20
Payer: COMMERCIAL

## 2025-08-20 VITALS
OXYGEN SATURATION: 98 % | DIASTOLIC BLOOD PRESSURE: 86 MMHG | BODY MASS INDEX: 31.5 KG/M2 | HEIGHT: 71 IN | SYSTOLIC BLOOD PRESSURE: 138 MMHG | TEMPERATURE: 97.2 F | HEART RATE: 75 BPM | WEIGHT: 225 LBS

## 2025-08-20 DIAGNOSIS — Z90.49 ACQUIRED ABSENCE OF OTHER SPECIFIED PARTS OF DIGESTIVE TRACT: ICD-10-CM

## 2025-08-20 DIAGNOSIS — M62.08 SEPARATION OF MUSCLE (NONTRAUMATIC), OTHER SITE: ICD-10-CM

## 2025-08-20 DIAGNOSIS — K43.6 OTHER AND UNSPECIFIED VENTRAL HERNIA WITH OBSTRUCTION, W/OUT GANGRENE: ICD-10-CM

## 2025-08-20 PROCEDURE — 99215 OFFICE O/P EST HI 40 MIN: CPT

## (undated) DEVICE — VENODYNE/SCD SLEEVE CALF MEDIUM

## (undated) DEVICE — DRSG STERISTRIPS 0.5 X 4"

## (undated) DEVICE — ELCTR BOVIE TIP BLADE INSULATED 2.75" EDGE

## (undated) DEVICE — DRAPE LIGHT HANDLE COVER (GREEN)

## (undated) DEVICE — PLV/PSP-SUCTION IRRIGATOR STRYKER: Type: DURABLE MEDICAL EQUIPMENT

## (undated) DEVICE — DRAPE 3/4 SHEET W REINFORCEMENT 56X77"

## (undated) DEVICE — BLADE SURGICAL #15 CARBON

## (undated) DEVICE — WARMING BLANKET UPPER ADULT

## (undated) DEVICE — SUT POLYSORB 3-0 30" V-20 UNDYED

## (undated) DEVICE — SOL IRR POUR H2O 1000ML

## (undated) DEVICE — TROCAR COVIDIEN VERSAPORT BLADELESS OPTICAL 11MM STANDARD

## (undated) DEVICE — TUBING STRYKER PNEUMOSURE HI FLOW INSUFFLATOR

## (undated) DEVICE — SUT MONOCRYL 4-0 27" PS-2 UNDYED

## (undated) DEVICE — PLV/PSP-ESU FORCE2 FIL 16736T: Type: DURABLE MEDICAL EQUIPMENT

## (undated) DEVICE — PLV-SCD MACHINE: Type: DURABLE MEDICAL EQUIPMENT

## (undated) DEVICE — VENODYNE/SCD SLEEVE CALF LARGE

## (undated) DEVICE — PLV/PSP-ESU VALLEYLAB T7E14761DX: Type: DURABLE MEDICAL EQUIPMENT

## (undated) DEVICE — TROCAR COVIDIEN VERSAONE BLUNT TIP HASSAN 12MM

## (undated) DEVICE — DISSECTOR ENDOSCOPIC KITTNER SINGLE TIP

## (undated) DEVICE — Device

## (undated) DEVICE — GLV 7.5 PROTEXIS (WHITE)

## (undated) DEVICE — GLV 8 PROTEXIS (BLUE)

## (undated) DEVICE — NDL HYPO SAFE 25G X 1.5" (ORANGE)

## (undated) DEVICE — SOL IRR BAG NS 0.9% 3000ML

## (undated) DEVICE — TROCAR COVIDIEN VERSAPORT BLADELESS OPTICAL 5MM STANDARD

## (undated) DEVICE — SUT POLYSORB 0 30" GU-46

## (undated) DEVICE — SUT VICRYL 0 18" ENDOLOOP LIGATURE

## (undated) DEVICE — ENDOCATCH 10MM

## (undated) DEVICE — APPLICATOR FOR ARISTA XL 38CM

## (undated) DEVICE — TROCAR COVIDIEN VERSAONE FIXATION CANNULA 5MM

## (undated) DEVICE — PACK GENERAL LAPAROSCOPY

## (undated) DEVICE — DRAPE TOWEL BLUE 17" X 24"

## (undated) DEVICE — D HELP - CLEARVIEW CLEARIFY SYSTEM

## (undated) DEVICE — SYR LUER LOK 10CC